# Patient Record
Sex: FEMALE | Race: WHITE
[De-identification: names, ages, dates, MRNs, and addresses within clinical notes are randomized per-mention and may not be internally consistent; named-entity substitution may affect disease eponyms.]

---

## 2018-09-21 ENCOUNTER — HOSPITAL ENCOUNTER (INPATIENT)
Dept: HOSPITAL 63 - ER | Age: 73
LOS: 13 days | Discharge: NURSING, REHAB OR LONG-TERM CARE | DRG: 885 | End: 2018-10-04
Attending: PSYCHIATRY & NEUROLOGY | Admitting: PSYCHIATRY & NEUROLOGY
Payer: MEDICARE

## 2018-09-21 VITALS — BODY MASS INDEX: 35.15 KG/M2 | WEIGHT: 191 LBS | HEIGHT: 62 IN

## 2018-09-21 DIAGNOSIS — N18.9: ICD-10-CM

## 2018-09-21 DIAGNOSIS — E78.5: ICD-10-CM

## 2018-09-21 DIAGNOSIS — I12.9: ICD-10-CM

## 2018-09-21 DIAGNOSIS — N39.0: ICD-10-CM

## 2018-09-21 DIAGNOSIS — F63.9: ICD-10-CM

## 2018-09-21 DIAGNOSIS — Z86.711: ICD-10-CM

## 2018-09-21 DIAGNOSIS — G20: ICD-10-CM

## 2018-09-21 DIAGNOSIS — F09: ICD-10-CM

## 2018-09-21 DIAGNOSIS — F33.41: ICD-10-CM

## 2018-09-21 DIAGNOSIS — R45.850: ICD-10-CM

## 2018-09-21 DIAGNOSIS — G25.9: ICD-10-CM

## 2018-09-21 DIAGNOSIS — J44.9: ICD-10-CM

## 2018-09-21 DIAGNOSIS — Z87.440: ICD-10-CM

## 2018-09-21 DIAGNOSIS — K59.09: ICD-10-CM

## 2018-09-21 DIAGNOSIS — Z86.718: ICD-10-CM

## 2018-09-21 DIAGNOSIS — Z98.42: ICD-10-CM

## 2018-09-21 DIAGNOSIS — F02.80: ICD-10-CM

## 2018-09-21 DIAGNOSIS — Z88.8: ICD-10-CM

## 2018-09-21 DIAGNOSIS — F33.3: Primary | ICD-10-CM

## 2018-09-21 DIAGNOSIS — F41.9: ICD-10-CM

## 2018-09-21 DIAGNOSIS — Z79.899: ICD-10-CM

## 2018-09-21 DIAGNOSIS — Z99.3: ICD-10-CM

## 2018-09-21 DIAGNOSIS — E11.22: ICD-10-CM

## 2018-09-21 DIAGNOSIS — K21.9: ICD-10-CM

## 2018-09-21 DIAGNOSIS — Z98.41: ICD-10-CM

## 2018-09-21 DIAGNOSIS — M10.9: ICD-10-CM

## 2018-09-21 DIAGNOSIS — Z86.73: ICD-10-CM

## 2018-09-21 DIAGNOSIS — Z79.01: ICD-10-CM

## 2018-09-21 LAB
ACETAMIN: < 2 MCG/ML (ref 10–30)
ALBUMIN SERPL-MCNC: 3.4 G/DL (ref 3.4–5)
ALBUMIN/GLOB SERPL: 0.9 {RATIO} (ref 1–1.7)
ALP SERPL-CCNC: 77 U/L (ref 46–116)
ALT SERPL-CCNC: 17 U/L (ref 14–59)
AMPHETAMINE/METHAMPHETAMINE: (no result)
ANION GAP SERPL CALC-SCNC: 8 MMOL/L (ref 6–14)
APTT PPP: YELLOW S
AST SERPL-CCNC: 6 U/L (ref 15–37)
BACTERIA #/AREA URNS HPF: (no result) /HPF
BARBITURATES UR-MCNC: (no result) UG/ML
BASOPHILS # BLD AUTO: 0.1 X10^3/UL (ref 0–0.2)
BASOPHILS NFR BLD: 1 % (ref 0–3)
BENZODIAZ UR-MCNC: (no result) UG/L
BILIRUB SERPL-MCNC: 0.5 MG/DL (ref 0.2–1)
BILIRUB UR QL STRIP: (no result)
BUN/CREAT SERPL: 26 (ref 6–20)
CA-I SERPL ISE-MCNC: 26 MG/DL (ref 7–20)
CALCIUM SERPL-MCNC: 9.2 MG/DL (ref 8.5–10.1)
CANNABINOIDS UR-MCNC: (no result) UG/L
CHLORIDE SERPL-SCNC: 104 MMOL/L (ref 98–107)
CO2 SERPL-SCNC: 29 MMOL/L (ref 21–32)
COCAINE UR-MCNC: (no result) NG/ML
CREAT SERPL-MCNC: 1 MG/DL (ref 0.6–1)
EOSINOPHIL NFR BLD: 0.5 X10^3/UL (ref 0–0.7)
EOSINOPHIL NFR BLD: 5 % (ref 0–3)
ERYTHROCYTE [DISTWIDTH] IN BLOOD BY AUTOMATED COUNT: 15.2 % (ref 11.5–14.5)
FIBRINOGEN PPP-MCNC: (no result) MG/DL
GFR SERPLBLD BASED ON 1.73 SQ M-ARVRAT: 54.5 ML/MIN
GLOBULIN SER-MCNC: 3.9 G/DL (ref 2.2–3.8)
GLUCOSE SERPL-MCNC: 134 MG/DL (ref 70–99)
GLUCOSE UR STRIP-MCNC: (no result) MG/DL
HCT VFR BLD CALC: 43.7 % (ref 36–47)
HGB BLD-MCNC: 14.7 G/DL (ref 12–15.5)
LYMPHOCYTES # BLD: 3.3 X10^3/UL (ref 1–4.8)
LYMPHOCYTES NFR BLD AUTO: 34 % (ref 24–48)
MAGNESIUM SERPL-MCNC: 1.6 MG/DL (ref 1.8–2.4)
MCH RBC QN AUTO: 32 PG (ref 25–35)
MCHC RBC AUTO-ENTMCNC: 34 G/DL (ref 31–37)
MCV RBC AUTO: 94 FL (ref 79–100)
METHADONE SERPL-MCNC: (no result) NG/ML
MONO #: 0.9 X10^3/UL (ref 0–1.1)
MONOCYTES NFR BLD: 10 % (ref 0–9)
NEUT #: 4.7 X10^3UL (ref 1.8–7.7)
NEUTROPHILS NFR BLD AUTO: 50 % (ref 31–73)
NITRITE UR QL STRIP: (no result)
OPIATES UR-MCNC: (no result) NG/ML
PCP SERPL-MCNC: (no result) MG/DL
PLATELET # BLD AUTO: 261 X10^3/UL (ref 140–400)
POTASSIUM SERPL-SCNC: 4.2 MMOL/L (ref 3.5–5.1)
PROT SERPL-MCNC: 7.3 G/DL (ref 6.4–8.2)
RBC # BLD AUTO: 4.63 X10^6/UL (ref 3.5–5.4)
RBC #/AREA URNS HPF: 0 /HPF (ref 0–2)
SALIC: 0.9 MG/DL (ref 2.8–20)
SODIUM SERPL-SCNC: 141 MMOL/L (ref 136–145)
SP GR UR STRIP: >=1.03
SQUAMOUS #/AREA URNS LPF: (no result) /LPF
UROBILINOGEN UR-MCNC: 0.2 MG/DL
WBC # BLD AUTO: 9.5 X10^3/UL (ref 4–11)

## 2018-09-21 PROCEDURE — 84484 ASSAY OF TROPONIN QUANT: CPT

## 2018-09-21 PROCEDURE — 83036 HEMOGLOBIN GLYCOSYLATED A1C: CPT

## 2018-09-21 PROCEDURE — 82947 ASSAY GLUCOSE BLOOD QUANT: CPT

## 2018-09-21 PROCEDURE — 85025 COMPLETE CBC W/AUTO DIFF WBC: CPT

## 2018-09-21 PROCEDURE — 86592 SYPHILIS TEST NON-TREP QUAL: CPT

## 2018-09-21 PROCEDURE — 85610 PROTHROMBIN TIME: CPT

## 2018-09-21 PROCEDURE — 82607 VITAMIN B-12: CPT

## 2018-09-21 PROCEDURE — 80061 LIPID PANEL: CPT

## 2018-09-21 PROCEDURE — 84436 ASSAY OF TOTAL THYROXINE: CPT

## 2018-09-21 PROCEDURE — 83735 ASSAY OF MAGNESIUM: CPT

## 2018-09-21 PROCEDURE — 80053 COMPREHEN METABOLIC PANEL: CPT

## 2018-09-21 PROCEDURE — P9612 CATHETERIZE FOR URINE SPEC: HCPCS

## 2018-09-21 PROCEDURE — 84480 ASSAY TRIIODOTHYRONINE (T3): CPT

## 2018-09-21 PROCEDURE — G0480 DRUG TEST DEF 1-7 CLASSES: HCPCS

## 2018-09-21 PROCEDURE — 84443 ASSAY THYROID STIM HORMONE: CPT

## 2018-09-21 PROCEDURE — 81001 URINALYSIS AUTO W/SCOPE: CPT

## 2018-09-21 PROCEDURE — 83550 IRON BINDING TEST: CPT

## 2018-09-21 PROCEDURE — 93005 ELECTROCARDIOGRAM TRACING: CPT

## 2018-09-21 PROCEDURE — 82553 CREATINE MB FRACTION: CPT

## 2018-09-21 PROCEDURE — 83540 ASSAY OF IRON: CPT

## 2018-09-21 PROCEDURE — 80307 DRUG TEST PRSMV CHEM ANLYZR: CPT

## 2018-09-21 PROCEDURE — 87086 URINE CULTURE/COLONY COUNT: CPT

## 2018-09-21 PROCEDURE — 94640 AIRWAY INHALATION TREATMENT: CPT

## 2018-09-21 PROCEDURE — 82306 VITAMIN D 25 HYDROXY: CPT

## 2018-09-21 PROCEDURE — G0479 DRUG TEST PRESUMP NOT OPT: HCPCS

## 2018-09-21 PROCEDURE — 36415 COLL VENOUS BLD VENIPUNCTURE: CPT

## 2018-09-21 NOTE — PHYS DOC
Past History


Past Medical History:  COPD, Dementia, Depression, Diabetes, GERD, Hypertension

, Other


Past Medical History


Limited by patient's history of dementia.


Past Surgical History:  Other


Past Surgical History


Limited by patient's history of dementia.


Smoking:  Non-smoker


Alcohol Use:  None


Drug Use:  None


Social History


Limited by patient's history of dementia.





Adult General


Chief Complaint


Chief Complaint:  PSYCH EVALUATION





HPI


HPI





Patient is a 73-year-old  female who presents to the ED from [facility] in [place], [place] for psychiatric evaluation for 

placement in the senior behavioral health unit. She states she was brought in 

by her brothers. She notes that she does not feel safe at the nursing facility 

as a result of her paranoid thoughts. She states that sometimes she thinks 

nursing staff at the home is "missing with my meds and not giving me the right 

things at the right time." She admits that it is possible that her suspicions 

are not real. She also endorses visual hallucinations and nightmares. She 

states that she had a fall a while back and since then she occasionally sees 

tissue paper around the room and sees cats coming in and out of her room. She 

notes that her nightmares "sometimes seem like reality". She currently denies 

suicidal ideation or homicidal ideation. She is unable to note when these 

symptoms began. Additionally, she notes tremors in all 4 extremities due to her 

Parkinson's and cramping pain in her toes, which she rates as a 3/10. She also 

notes mild epigastric burning as a result of her GERD. She denies any headache, 

dizziness, nausea, vomiting, diarrhea, constipation, chest pain, or heart 

palpitations.





HPI limited by patient's history of dementia.





Review of Systems


Review of Systems





Constitutional: Denies fever or chills


Eyes: Denies change in visual acuity, redness, or eye pain


HENT: Denies nasal congestion or sore throat


Respiratory: Denies cough or shortness of breath


Cardiovascular: Denies chest pain or heart palpitations


GI: Denies abdominal pain, nausea, vomiting, diarrhea, or constipation


Musculoskeletal: Notes slight joint pain; Denies back pain


Integument: Denies rash or skin lesions


Neurologic: Notes tremors in all 4 extremities; Denies headache, focal weakness 

or sensory changes


Psychiatric: Notes paranoia and visual hallucinations; Denies suicidal ideation 

or homicidal ideation








ROS limited by patient's history of dementia.





Family History


Family History


Noncontributory





Allergies


Allergies





Allergies








Coded Allergies Type Severity Reaction Last Updated Verified


 


  Sulfa (Sulfonamide Antibiotics) Allergy Unknown  9/21/18 Yes


 


  ketamine Allergy Unknown  9/21/18 Yes











Physical Exam


Physical Exam





Constitutional: Slightly disheveled, no acute distress, non-toxic appearance


HENT: Normocephalic, atraumatic, oropharynx dry


Eyes: Conjunctiva normal, no discharge


Neck: No tenderness, supple


Cardiovascular: Heart rate regular rhythm, no murmur


Lungs & Thorax:  Bilateral breath sounds clear to auscultation


Abdomen: Soft, no tenderness


Skin: Warm, dry, no erythema, no rash


Back: No tenderness, no CVA tenderness


Extremities: No tenderness, ROM intact, no edema


Neurologic: Alert and oriented X 3, generalized weakness, moderate tremors in 

all 4 extremities that worsen with movement, normal sensory function


Psychologic: Affect normal, judgement abnormal, patient with some paranoid 

delusions





Current Patient Data


Vital Signs





 Vital Signs








  Date Time  Temp Pulse Resp B/P (MAP) Pulse Ox O2 Delivery O2 Flow Rate FiO2


 


9/21/18 19:54 98.2 80 22  95 Room Air  








Lab Results





 Laboratory Tests








Test


 9/21/18


20:13 9/21/18


20:20


 


White Blood Count


 9.5 x10^3/uL


(4.0-11.0) 





 


Red Blood Count


 4.63 x10^6/uL


(3.50-5.40) 





 


Hemoglobin


 14.7 g/dL


(12.0-15.5) 





 


Hematocrit


 43.7 %


(36.0-47.0) 





 


Mean Corpuscular Volume


 94 fL ()


 





 


Mean Corpuscular Hemoglobin 32 pg (25-35)   


 


Mean Corpuscular Hemoglobin


Concent 34 g/dL


(31-37) 





 


Red Cell Distribution Width


 15.2 %


(11.5-14.5)  H 





 


Platelet Count


 261 x10^3/uL


(140-400) 





 


Neutrophils (%) (Auto) 50 % (31-73)   


 


Lymphocytes (%) (Auto) 34 % (24-48)   


 


Monocytes (%) (Auto) 10 % (0-9)  H 


 


Eosinophils (%) (Auto) 5 % (0-3)  H 


 


Basophils (%) (Auto) 1 % (0-3)   


 


Neutrophils # (Auto)


 4.7 x10^3uL


(1.8-7.7) 





 


Lymphocytes # (Auto)


 3.3 x10^3/uL


(1.0-4.8) 





 


Monocytes # (Auto)


 0.9 x10^3/uL


(0.0-1.1) 





 


Eosinophils # (Auto)


 0.5 x10^3/uL


(0.0-0.7) 





 


Basophils # (Auto)


 0.1 x10^3/uL


(0.0-0.2) 





 


Urine Collection Type U cath   


 


Urine Color Yellow   


 


Urine Clarity Hazy   


 


Urine pH 5.5   


 


Urine Specific Gravity >=1.030   


 


Urine Protein


 30 mg/dl


(NEG-TRACE) 





 


Urine Glucose (UA)


 Neg mg/dL


(NEG) 





 


Urine Ketones (Stick)


 Trace mg/dL


(NEG) 





 


Urine Blood Neg (NEG)   


 


Urine Nitrite Neg (NEG)   


 


Urine Bilirubin Neg (NEG)   


 


Urine Urobilinogen Dipstick


 0.2 mg/dL (0.2


mg/dL) 





 


Urine Leukocyte Esterase Small (NEG)   


 


Urine RBC 0 /HPF (0-2)   


 


Urine WBC


 11-20 /HPF


(0-4) 





 


Urine Squamous Epithelial


Cells Mod /LPF  


 





 


Urine Transitional Epithelial


Cells Occ /LPF  


 





 


Urine Bacteria


 Few /HPF


(0-FEW) 





 


Urine Mucus Slight /LPF   


 


Sodium Level


 141 mmol/L


(136-145) 





 


Potassium Level


 4.2 mmol/L


(3.5-5.1) 





 


Chloride Level


 104 mmol/L


() 





 


Carbon Dioxide Level


 29 mmol/L


(21-32) 





 


Anion Gap 8 (6-14)   


 


Blood Urea Nitrogen


 26 mg/dL


(7-20)  H 





 


Creatinine


 1.0 mg/dL


(0.6-1.0) 





 


Estimated GFR


(Cockcroft-Gault) 54.5  


 





 


BUN/Creatinine Ratio 26 (6-20)  H 


 


Glucose Level


 134 mg/dL


(70-99)  H 





 


Calcium Level


 9.2 mg/dL


(8.5-10.1) 





 


Magnesium Level


 1.6 mg/dL


(1.8-2.4)  L 





 


Total Bilirubin


 0.5 mg/dL


(0.2-1.0) 





 


Aspartate Amino Transferase


(AST) 6 U/L (15-37)


L 





 


Alanine Aminotransferase (ALT)


 17 U/L (14-59)


 





 


Alkaline Phosphatase


 77 U/L


() 





 


Creatine Kinase


 97 U/L


() 





 


Creatine Kinase MB (Mass)


 2.7 ng/mL


(0.0-3.6) 





 


Creatine Kinase MB Relative


Index 2.8 % (0-4)  


 





 


Troponin I Quantitative


 < 0.017 ng/mL


(0-0.055) 





 


Total Protein


 7.3 g/dL


(6.4-8.2) 





 


Albumin


 3.4 g/dL


(3.4-5.0) 





 


Albumin/Globulin Ratio


 0.9 (1.0-1.7)


L 





 


Salicylates Level


 0.9 mg/dL


(2.8-20.0)  L 





 


Salicylate Last Dose Date Unk   


 


Salicylate Last Dose Time Unk   


 


Urine Opiates Screen Pos (NEG)   


 


Urine Methadone Screen Neg (NEG)   


 


Acetaminophen Level


 < 2.0 mcg/mL


(10-30)  L 





 


Acetaminophen Last Dose Date Unk   


 


Acetaminophen Last Dose Time Unk   


 


Urine Barbiturates Neg (NEG)   


 


Urine Phencyclidine Screen Neg (NEG)   


 


Urine


Amphetamine/Methamphetamine Neg (NEG)  


 





 


Urine Benzodiazepines Screen Neg (NEG)   


 


Urine Cocaine Screen Neg (NEG)   


 


Urine Cannabinoids Screen Neg (NEG)   


 


Ethyl Alcohol Level


 < 10 mg/dL


(0-10) 





 


Urine Ethyl Alcohol Neg (NEG)   


 


Glucose (Fingerstick)


 


 159 mg/dL


(70-99)  H











EKG


EKG


@ 22:02, NSR with baseline artifact, NO ST elevation





Radiology/Procedures


Radiology/Procedures


[]





Course & Med Decision Making


Course & Med Decision Making





Patient is a 73-year-old  female who presents to the ED from [facility] in [place], [place] for psychiatric evaluation for 

placement in the senior behavioral health unit. Pertinent Labs and Imaging 

studies reviewed (see chart for details). A 12-lead EKG at 22:02 was obtained 

and showed NSR with significant artifact due to patient's tremors. Her CBC and 

CMP results were consistent with her baseline. Urinalysis showed hazy urine 

with small leukocyte esterase, 11-20 WBC, and some protein. Suspect possible 

UTI. Patient will be started on Keflex. Patient also received a one-time dose 

of Ativan for her tremors and anxiety and a one-time dose of famotidine for her 

epigastric burning. Patient will be admitted to the Senior Behavioral Unit here 

at [facility] for further evaluation and treatment under Dr. Schafer. 

Patient medically cleared for admission to [facility]. Discussed 

findings and plan with patient who acknowledges understanding and agreement.





Dragon Disclaimer


Dragon Disclaimer


This electronic medical record was generated, in whole or in part, using a 

voice recognition dictation system.





Departure


Departure:


Impression:  


 Primary Impression:  


 MDD (major depressive disorder)


 Additional Impressions:  


 Dementia


 UTI (urinary tract infection)


Disposition:  66 XFER TO PSYCH HOSP/UNIT (Senior Behavioral Unit at [facility])


Admitting Physician:  Other (Dr. ALEXANDR Moseley)


Condition:  GUARDED


Referrals:  


KRISTEN RENTERIA DO (PCP)





Problem Qualifiers








 Primary Impression:  


 MDD (major depressive disorder)


 Major depression recurrence:  recurrent  Active/Remission status:  currently 

active  Major depression episode severity:  severe  Psychotic features:  with 

psychotic features  Qualified Codes:  F33.3 - Major depressive disorder, 

recurrent, severe with psychotic symptoms


 Additional Impressions:  


 Dementia


 Dementia type:  unspecified type  Dementia behavioral disturbance:  with 

behavioral disturbance  Qualified Codes:  F03.91 - Unspecified dementia with 

behavioral disturbance


 UTI (urinary tract infection)


 Urinary tract infection type:  acute cystitis  Hematuria presence:  without 

hematuria  Qualified Codes:  N30.00 - Acute cystitis without hematuria








PAT YUAN DO Sep 21, 2018 21:55

## 2018-09-21 NOTE — EKG
[facility] [address], [place] [address]

Test Date:    2018               Test Time:    22:02:18

Pat Name:     JULES DEE             Department:   

Patient ID:   [id number]           Room:          

Gender:       F                        Technician:   

:          1945               Requested By: PAT YUAN

Order Number: [id number]            Reading MD:   Franklin Park

                                 Measurements

Intervals                              Axis          

Rate:         69                       P:            

CO:                                    QRS:          121

QRSD:         92                       T:            19

QT:           368                                    

QTc:          396                                    

                           Interpretive Statements

SINUS RHYTHM

ABNORMAL RIGHT AXIS DEVIATION



Electronically Signed On 2018 10:52:56 CDT by Franklin Park

## 2018-09-22 VITALS — DIASTOLIC BLOOD PRESSURE: 69 MMHG | SYSTOLIC BLOOD PRESSURE: 145 MMHG

## 2018-09-22 VITALS — SYSTOLIC BLOOD PRESSURE: 148 MMHG | DIASTOLIC BLOOD PRESSURE: 75 MMHG

## 2018-09-22 VITALS — SYSTOLIC BLOOD PRESSURE: 163 MMHG | DIASTOLIC BLOOD PRESSURE: 100 MMHG

## 2018-09-22 LAB
CHOLEST/HDLC SERPL: 3 {RATIO}
HBA1C MFR BLD: 7.1 % (ref 4.8–5.6)
HDLC SERPL-MCNC: 61 MG/DL (ref 40–60)
LDLC: 124 MG/DL (ref 0–100)
THYROID STIM HORMONE (TSH): 0.63 UIU/ML (ref 0.36–3.74)
TRIGL SERPL-MCNC: 195 MG/DL (ref 0–150)
VLDLC: 39 MG/DL (ref 0–40)

## 2018-09-22 RX ADMIN — SENNOSIDES A AND B SCH MG: 8.6 TABLET, FILM COATED ORAL at 09:00

## 2018-09-22 RX ADMIN — BUDESONIDE SCH MG: 0.5 SUSPENSION RESPIRATORY (INHALATION) at 20:48

## 2018-09-22 RX ADMIN — HYDROCODONE BITARTRATE AND ACETAMINOPHEN SCH TAB: 5; 325 TABLET ORAL at 19:59

## 2018-09-22 RX ADMIN — HYDROCODONE BITARTRATE AND ACETAMINOPHEN SCH TAB: 5; 325 TABLET ORAL at 17:07

## 2018-09-22 RX ADMIN — POTASSIUM CHLORIDE SCH MEQ: 1500 TABLET, EXTENDED RELEASE ORAL at 08:16

## 2018-09-22 RX ADMIN — CEPHALEXIN SCH MG: 250 CAPSULE ORAL at 19:53

## 2018-09-22 RX ADMIN — IPRATROPIUM BROMIDE AND ALBUTEROL SULFATE SCH ML: .5; 3 SOLUTION RESPIRATORY (INHALATION) at 20:48

## 2018-09-22 RX ADMIN — LOSARTAN POTASSIUM SCH MG: 25 TABLET, FILM COATED ORAL at 08:16

## 2018-09-22 RX ADMIN — Medication PRN EACH: at 11:16

## 2018-09-22 RX ADMIN — CARBIDOPA AND LEVODOPA SCH TAB: 25; 250 TABLET ORAL at 17:06

## 2018-09-22 RX ADMIN — HYDROCODONE BITARTRATE AND ACETAMINOPHEN SCH TAB: 5; 325 TABLET ORAL at 08:18

## 2018-09-22 RX ADMIN — HYDROCODONE BITARTRATE AND ACETAMINOPHEN SCH TAB: 5; 325 TABLET ORAL at 12:42

## 2018-09-22 RX ADMIN — OXYCODONE HYDROCHLORIDE AND ACETAMINOPHEN SCH MG: 500 TABLET ORAL at 08:18

## 2018-09-22 RX ADMIN — ALLOPURINOL SCH MG: 300 TABLET ORAL at 08:17

## 2018-09-22 RX ADMIN — METHENAMINE HIPPURATE SCH GM: 1 TABLET ORAL at 08:19

## 2018-09-22 RX ADMIN — IPRATROPIUM BROMIDE AND ALBUTEROL SULFATE SCH ML: .5; 3 SOLUTION RESPIRATORY (INHALATION) at 16:00

## 2018-09-22 RX ADMIN — METOPROLOL SUCCINATE SCH MG: 50 TABLET, EXTENDED RELEASE ORAL at 08:17

## 2018-09-22 RX ADMIN — OXYCODONE HYDROCHLORIDE AND ACETAMINOPHEN SCH MG: 500 TABLET ORAL at 19:53

## 2018-09-22 RX ADMIN — INSULIN LISPRO SCH UNITS: 100 INJECTION, SOLUTION INTRAVENOUS; SUBCUTANEOUS at 12:41

## 2018-09-22 RX ADMIN — METHENAMINE HIPPURATE SCH GM: 1 TABLET ORAL at 19:59

## 2018-09-22 RX ADMIN — INSULIN LISPRO SCH UNITS: 100 INJECTION, SOLUTION INTRAVENOUS; SUBCUTANEOUS at 17:10

## 2018-09-22 RX ADMIN — INSULIN LISPRO SCH UNITS: 100 INJECTION, SOLUTION INTRAVENOUS; SUBCUTANEOUS at 08:20

## 2018-09-22 RX ADMIN — MELATONIN TAB 3 MG SCH MG: 3 TAB at 19:58

## 2018-09-22 RX ADMIN — POLYETHYLENE GLYCOL 3350 SCH GM: 17 POWDER, FOR SOLUTION ORAL at 09:00

## 2018-09-22 RX ADMIN — IPRATROPIUM BROMIDE AND ALBUTEROL SULFATE SCH ML: .5; 3 SOLUTION RESPIRATORY (INHALATION) at 16:47

## 2018-09-22 RX ADMIN — PANTOPRAZOLE SODIUM SCH MG: 40 TABLET, DELAYED RELEASE ORAL at 08:16

## 2018-09-22 RX ADMIN — POLYETHYLENE GLYCOL 3350 SCH GM: 17 POWDER, FOR SOLUTION ORAL at 08:18

## 2018-09-22 RX ADMIN — CEPHALEXIN SCH MG: 250 CAPSULE ORAL at 12:42

## 2018-09-22 RX ADMIN — AMANTADINE HYDROCHLORIDE SCH MG: 100 CAPSULE ORAL at 08:15

## 2018-09-22 RX ADMIN — SENNOSIDES A AND B SCH MG: 8.6 TABLET, FILM COATED ORAL at 08:16

## 2018-09-22 RX ADMIN — IPRATROPIUM BROMIDE AND ALBUTEROL SULFATE SCH ML: .5; 3 SOLUTION RESPIRATORY (INHALATION) at 12:08

## 2018-09-22 RX ADMIN — SERTRALINE HYDROCHLORIDE SCH MG: 50 TABLET ORAL at 08:16

## 2018-09-22 RX ADMIN — LUBIPROSTONE SCH MCG: 24 CAPSULE, GELATIN COATED ORAL at 08:19

## 2018-09-22 RX ADMIN — CARBIDOPA AND LEVODOPA SCH TAB: 25; 250 TABLET ORAL at 05:31

## 2018-09-22 RX ADMIN — Medication SCH CAP: at 19:58

## 2018-09-22 RX ADMIN — INSULIN GLARGINE SCH UNITS: 100 INJECTION, SOLUTION SUBCUTANEOUS at 20:00

## 2018-09-22 RX ADMIN — CETIRIZINE HYDROCHLORIDE SCH MG: 10 TABLET, FILM COATED ORAL at 08:17

## 2018-09-22 RX ADMIN — BUDESONIDE SCH MG: 0.5 SUSPENSION RESPIRATORY (INHALATION) at 12:08

## 2018-09-22 RX ADMIN — CARBIDOPA AND LEVODOPA SCH TAB: 25; 250 TABLET ORAL at 12:41

## 2018-09-22 RX ADMIN — CEPHALEXIN SCH MG: 250 CAPSULE ORAL at 08:17

## 2018-09-22 RX ADMIN — AMANTADINE HYDROCHLORIDE SCH MG: 100 CAPSULE ORAL at 19:59

## 2018-09-22 NOTE — HP
ADMIT DATE:  09/22/2018



PSYCHIATRIC ADMISSION HISTORY/EVALUATION



This note covers elements not covered in my initial note 09/22/2018.



IDENTIFYING DATA:  The patient is a 73-year-old  female referred to us

from [facility] by Dr. Pawan Marquez, her primary care physician

on account of worsening paranoia.  The patient was getting extremely paranoid,

suspicious, and aggressive.  She pulled the hair of nursing staff, had voiced

suicidal ideation, talking about Gus coming to get everyone.  She believes she

was being poisoned and killed by staff.  She had expressed a desire to die and

later also said Gus will come to help her walk and to get her away from the

devil.  She had failed outpatient psychiatric interventions including visits

with the psychologist.



CHIEF COMPLAINT:  "My troubles get worse at times.  I have come here to get

that corrected."



HISTORY OF PRESENT ILLNESS:  The patient has a history of mild cognitive

impairment, short term memory deficits.  She has appeared increasingly

depressed, paranoid, suspicious, anxious, impulsive with suicidal and homicidal

statements as noted above.  No clear history of bipolar disorder.  She has also

had sleep and appetite changes.  The parkinsonian tremors have been worsening.



PAST PSYCHIATRIC HISTORY:  As above.



PAST MEDICAL HISTORY:  Positive for Parkinson's disease, COPD, type 2 diabetes

mellitus, history of PE, hyperlipidemia, GERD, hypokalemia, status post

cerebrovascular accident.  Accu-Cheks a.c. and at bedtime.  She does have a UTI

and started on Keflex in the ER.  She ambulates in a wheelchair.



ALLERGIES:  KETAMINE, SULFA.



FAMILY HISTORY:  Noncontributory.



SOCIAL HISTORY:  No physical, sexual or elder abuse history is noted.  She is

not known to be a perpetrator.  She denies any alcohol usage, but she states for

1 year following her divorce, she was using excessive amounts of alcohol that

worsened, not passing out and denies any blackouts, DUIs or DTs at that time.



REACTION TO HOSPITALIZATION:  The patient accepting of it.



ASSETS:  Supportive family, stable living at the nursing home.



MENTAL STATUS EXAMINATION:  The patient was seen individually on the evening of

09/22/2018.  She is oriented to herself and situation.  She initially thought

the year was 2019, corrected it later to 2018 with my assistance.  She knew the

President, who is President, Trump.  Speech is coherent, somewhat low in volume,

parkinsonian tremors were evident.  Abstraction fair.  Computation, able to do

one step in serial sevens, remember 2/3 objects at 3 minutes.  No active

suicidal or homicidal ideation.  Attention span short, language function intact.

 Mood is somewhat depressed, anxious.  Affect is mood congruent.



IMPRESSION:  Major depressive disorder, recurrent; anxiety disorder,

unspecified; mild cognitive impairment versus major neurocognitive disorder,

early secondary to Parkinson's with delusion, depression.



IMPRESSION:  Major depressive disorder, rule out psychotic features; anxiety

disorder, unspecified; impulse control disorder, unspecified; mild cognitive

impairment.  Rest as above including urinary tract infection.



PLAN:  Admit to Geropsychiatry Unit at [facility].  I will see the

patient daily individually from a psychiatric standpoint, medical followup with

Dr. Pritchard/Dr. Heath.  We will request Dr. Montoya for followup secondary to

Parkinson's and at the time of this dictation, she has been started on Sinemet

p.r.n. 25/100 b.i.d. per Dr. Montoya.  We will start melatonin 3 mg at bedtime

since she slept just 2-1/2 hours previous night.  Further changes depending on

baseline assessment.





______________________________

JULIANA CARDONA MD



DR:  RANCHO/angel  JOB#:  [id number] / [id number]

DD:  09/22/2018 18:32  DT:  09/22/2018 19:12

## 2018-09-22 NOTE — CONS
DATE OF CONSULTATION:  2018



HISTORY OF PRESENT ILLNESS:  The patient is a 73-year-old  female

patient, a resident at [facility], who was admitted on account of being

paranoid, believes that the staff is trying to kill her, expresses that she

wants to die because Gus will come to her and help her walk to get away from

the devil, all this in a background of dementia with behavioral disturbances. 

Medically, she has multiple medical problems including Parkinson's disease,

chronic kidney disease, type 2 diabetes, history of pulmonary embolism,

hyperlipidemia, gastroesophageal reflux disease, hyperkalemia, COPD and history

of cerebrovascular accident.



PAST SURGICAL HISTORY:  Significant for two  sections and bilateral

cataract extraction.



ALLERGIES:  SHE IS ALLERGIC TO SULFA DRUGS AND KETAMINE.



MEDICATIONS:  She is currently on following medications:  She is on loratadine

for Claritin 10 mg once a day, methenamine hippurate 1 gram twice a day,

ipratropium bromide -- albuterol sulfate 0.5 -- 2.5 mg in 3 mL by nebulizer 4

times a day, Spiriva HandiHaler 1 inhalation once a day, warfarin sodium 1.5 mg

daily, metoprolol succinate 100 mg once a day, Benicar 5 mg once a day,

hydrocodone/APAP 5/325 one tablet twice a day as needed and one tablet 4 times a

day scheduled.  She is on sertraline for Zoloft 50 mg once a day, Ativan 1 mg 3

times a day, amantadine 200 mg twice a day.  She is on carbidopa/levodopa 25/250

four times a day, potassium chloride 20 mEq once a day, Robitussin-DM 5 mL every

4 hours as needed.  She is on Symbicort 160/4.5 two puffs twice a day,

artificial tears 1 drop to both eyes 4 times a day, calcium carbonate 400 mg 3

times a day and Dayana-Lanta 30 mL every 6 hours, simethicone 80 mg 4 times a day

as needed, Amitiza 24 mcg p.o. daily and polyethylene glycol 17 grams daily and

Senna 1 tablet once a day, meclizine 25 mg 3 times a day, ondansetron 8 mg 3

times a day as needed for nausea and vomiting, omeprazole 20 mg once a day.  She

is on NovoLog insulin 5 units before meals and Lantus insulin 26 units at

bedtime, ascorbate calcium 500 mg twice a day, and allopurinol 300 mg daily.



REVIEW OF SYSTEMS:  The patient denied any blurring of vision, cataract,

glaucoma, or macular degeneration.  Denied any earache, tinnitus, or

sensorineural deafness.  Denied any nosebleeds, stuffy nose, or postnasal drip. 

Denied any sore throat, sore tongue, toothache, hoarseness of voice, or

difficulty swallowing.  Denied any nausea, vomiting, diarrhea, or constipation. 

Denied any chest pain, shortness of breath, orthopnea, paroxysmal nocturnal

dyspnea.



PHYSICAL EXAMINATION:

GENERAL:  When I examined her, she was sitting comfortably in her wheelchair, in

no apparent respiratory distress.  There is no pallor, jaundice or cyanosis.  No

lymphadenopathy, no thyromegaly.  No jugular venous distention.  No limb edema.

VITAL SIGNS:  Her heart rate was 88, blood pressure was 145/69, temperature was

98, respiratory rate 20, and oxygen saturation was 95% on room air.

HEAD, EYES, EARS, NOSE, AND THROAT:  Showed normocephalic, atraumatic.

NECK:  Supple.

HEART:  Showed normal first and second sounds.  No gallop, rub or murmur.

CHEST:  Clear to auscultation.  No crepitation or rhonchi.

ABDOMEN:  Distended, soft, and nontender.  No guarding or rigidity.  No

organomegaly.  All hernial orifice intact.  Bowel sounds normal.

NEUROLOGIC:  She is awake, alert.  She has a slow monotonous face, voice

consistent with Parkinson's disease and she has also reduced kinking and a pill

rolling tremors of both upper extremities that seems to be more pronounced. 

According to nursing staff, early this morning, she is mostly wheelchair bound. 

She stated that she can walk with a walker, but she needs assistance.



LABORATORY DATA:  Showed a white cell count 9500, hemoglobin 14.7, hematocrit

44, MCV 94 and a platelet count of 161,000.  Her prothrombin time was 20.5 and

INR of 2.1.  Her serum sodium was 141, potassium 4.2, chloride 104, bicarbonate

29, anion gap of 8, BUN 26, creatinine 1, estimated GFR was 54 mL per minute. 

Her glucose 134, calcium was 9.2, and magnesium was 1.6.  Serum iron was 51,

TIBC was 311 and iron saturation was 16%.  Her total bilirubin, AST, ALT,

alkaline phosphatase were normal.  Total protein was 7.3 and albumin 3.4.  Her

serum triglycerides were 195, total cholesterol was 224, LDL cholesterol 124,

VLDL was 39, HDL was 61 and the ratio was 3.  Her TSH was 0.633.  Her urinalysis

showed the urine was yellow, hazy with a pH of 5.5, specific gravity of 1.030 to

a small amount of protein, negative for glucose.  There was trace of ketones,

negative for blood, nitrite and small amount of leukocyte esterase.  There are 0

rbc's, 11-20 wbc's, moderate amount of epithelial cells and very few bacteria. 

Her urine toxicology screen was positive for opiates, but negative for

barbiturates, phencyclidine, amphetamine, methamphetamine, benzodiazepine,

cocaine, cannabinoids and alcohol.



IMPRESSION:  In summary, this is a 73-year-old  female patient, a

resident at [facility] in [place], [place], who was admitted

for being paranoid, stating that the nursing staff are planning to kill her. 

She also has visual hallucination and nightmares.  She stated that she sees a

lot of rabbits and she has unexplained fears of her closet.  She also see cats

coming in and out of her room; however, she denied any suicidal or homicidal

ideation.  She is on Sinemet that might be contributing to all these symptoms;

however, she has prominent parkinsonian symptoms and signs.  She has multiple

other medical problems including gout, gastroesophageal reflux disease, type 2

diabetes, chronic constipation, hypertension and history of DVT and PE for which

she is on Coumadin.  All in all, she seemed to be medically stable.  All her

vital signs are stable.  Her blood pressure seems to be within acceptable range.

 I will continue with all current medication.  I will obviously follow all the

lab work, still pending and make any necessary recommendation.



Thank you, Dr. Moseley for allowing me to participate in the care of this patient.





______________________________

EFRAÍN GREGORY MD



DR:  JAKOB/angel  JOB#:  [id number] / [id number]

DD:  2018 15:12  DT:  2018 22:39

## 2018-09-22 NOTE — PDOC
Exam


Note:


[facility] Note:


Please also refer to the separate dictated note~for this date of service 

dictated separately.~Patient seen individually. Discussed the patient with 

Nursing staff reviewed the chart.~Reviewed interim history and current 

functioning. Reviewed vital signs,~Labs/ Radiology~and current medications 

noted below. Continue current treatment with the changes noted in the dictated 

addendum note





Assessment:


Vital Signs:





 Vital Signs








  Date Time  Temp Pulse Resp B/P (MAP) Pulse Ox O2 Delivery O2 Flow Rate FiO2


 


9/22/18 20:59     95 Room Air  


 


9/22/18 17:07   20     


 


9/22/18 16:10 97.8 93  148/75 (99)    








Labs:





 Laboratory Tests








Test


 9/22/18


06:27 9/22/18


07:51 9/22/18


12:10 9/22/18


16:49


 


Prothrombin Time


 20.5 SEC


(9.4-11.4)  H 


 


 





 


Prothrombin Time INR


 2.1 (0.9-1.1)


H 


 


 





 


Hemoglobin A1c


 7.1 %


(4.8-5.6)  H 


 


 





 


Triglycerides Level


 195 mg/dL


(0-150)  H 


 


 





 


Cholesterol Level


 224 mg/dL


(0-200)  H 


 


 





 


LDL Cholesterol, Calculated


 124 mg/dL


(0-100)  H 


 


 





 


VLDL Cholesterol, Calculated


 39 mg/dL


(0-40) 


 


 





 


Non-HDL Cholesterol Calculated


 163 mg/dL


(0-129)  H 


 


 





 


HDL Cholesterol


 61 mg/dL


(40-60)  H 


 


 





 


Cholesterol/HDL Ratio 3.0     


 


Thyroid Stimulating Hormone


(TSH) 0.633 uIU/mL


(0.358-3.740) 


 


 





 


Glucose (Fingerstick)


 


 203 mg/dL


(70-99)  H 190 mg/dL


(70-99)  H 165 mg/dL


(70-99)  H


 


Test


 9/22/18


19:16 


 


 





 


Glucose (Fingerstick)


 184 mg/dL


(70-99)  H 


 


 














Current Medications:


Meds:





Current Medications


Cephalexin HCl (Keflex) 500 mg 1X  ONCE PO  Last administered on 9/21/18at 23:21

;  Start 9/21/18 at 23:15;  Stop 9/21/18 at 23:16;  Status DC


Insulin Human Lispro (HumaLOG) 0-5 UNITS TIDWMEALS SQ  Last administered on 9/22 /18at 17:10;  Start 9/22/18 at 08:00


Dextrose 12.5 gm PRN Q15MIN  PRN IV SEE COMMENTS;  Start 9/21/18 at 23:00


Cephalexin HCl (Keflex) 500 mg TID PO  Last administered on 9/22/18at 19:53;  

Start 9/22/18 at 09:00;  Stop 9/29/18 at 08:59


Lorazepam (Ativan) 0.5 mg 1X  ONCE PO  Last administered on 9/21/18at 23:21;  

Start 9/21/18 at 23:15;  Stop 9/21/18 at 23:16;  Status DC


Famotidine (Pepcid) 20 mg 1X  ONCE PO  Last administered on 9/21/18at 23:21;  

Start 9/21/18 at 23:15;  Stop 9/21/18 at 23:16;  Status DC


Acetaminophen (Tylenol) 650 mg PRN Q6HRS  PRN PO PAIN / TEMP;  Start 9/22/18 at 

00:15


Multi-Ingredient Ointment (Analgesic Balm) 1 mami PRN QID  PRN TP MUSCLE PAIN;  

Start 9/22/18 at 00:15


Magnesium Hydroxide (Milk Of Magnesia) 2,400 mg PRN QHS  PRN PO CONSTIPATION;  

Start 9/22/18 at 00:15


Calcium Carbonate/ Glycine (Tums) 400 mg PRN TID  PRN PO INDIGESTION;  Start 9/ 22/18 at 00:15


Insulin Glargine (Lantus) 26 units HS SQ  Last administered on 9/22/18at 20:00;

  Start 9/22/18 at 21:00


Albuterol Sulfate (Ventolin) 2.5 mg PRN QID  PRN NEB SHORTNESS OF BREATH;  

Start 9/22/18 at 00:15


Lubiprostone (Amitiza) 24 mcg DAILY PO  Last administered on 9/22/18at 08:19;  

Start 9/22/18 at 09:00


Methenamine Hippurate (Hiprex) 1 gm BID PO  Last administered on 9/22/18at 19:59

;  Start 9/22/18 at 09:00


Potassium Chloride (Klor-Con) 20 meq DAILY PO  Last administered on 9/22/18at 08

:16;  Start 9/22/18 at 09:00


Simethicone (Gas-X) 80 mg PRN QID  PRN PO GAS / BLOATING;  Start 9/22/18 at 00:

15


Allopurinol (Zyloprim) 300 mg DAILY PO  Last administered on 9/22/18at 08:17;  

Start 9/22/18 at 09:00


Amantadine HCl (Symmetrel) 200 mg BID PO  Last administered on 9/22/18at 19:59;

  Start 9/22/18 at 09:00


Ascorbic Acid (Vitamin C) 500 mg BID PO  Last administered on 9/22/18at 19:53;  

Start 9/22/18 at 09:00


Carbidopa/Levodopa (Sinemet 25/250) 1 tab [id number] PO  Last administered on 9/ 22/18at 17:06;  Start 9/22/18 at 06:00


Artificial Tears (Artificial Tears) 1 drop PRN Q6HRS  PRN OU DRY EYE;  Start 9/ 22/18 at 00:15


Guaifenesin (Robitussin Dm) 5 ml PRN Q4HRS  PRN PO COUGH;  Start 9/22/18 at 00:

15


Acetaminophen/ Hydrocodone Bitart (Lortab 5/325) 1 tab PRN BID  PRN PO PAIN;  

Start 9/22/18 at 00:15


Acetaminophen/ Hydrocodone Bitart (Lortab 5/325) 1 tab QID PO  Last 

administered on 9/22/18at 19:59;  Start 9/22/18 at 09:00


Insulin Aspart (Novolog Mix 70-30) 5 units TIDWMEALS SQ ;  Start 9/22/18 at 08:

00


Cetirizine HCl (ZyrTEC) 10 mg DAILY PO  Last administered on 9/22/18at 08:17;  

Start 9/22/18 at 09:00


Al Hydroxide/Mg Hydroxide (Mylanta Plus Xs) 30 ml PRN Q6HRS  PRN PO DYSPEPSIA;  

Start 9/22/18 at 00:15


Meclizine HCl (Antivert) 25 mg PRN TID  PRN PO DIZZINESS;  Start 9/22/18 at 00:

15


Metoprolol Succinate (Toprol Xl) 100 mg DAILY PO  Last administered on 9/22/ 18at 08:17;  Start 9/22/18 at 09:00


Ondansetron HCl (Zofran Odt) 8 mg PRN TID  PRN PO NAUSEA/VOMITING;  Start 9/22/ 18 at 00:15


Polyethylene Glycol (miraLAX) 17 gm DAILY PO ;  Start 9/22/18 at 09:00


Sennosides (Senna) 8.6 mg DAILY PO ;  Start 9/22/18 at 09:00


Warfarin Sodium (Coumadin Per Pharmacy) 1 each PRN DAILY  PRN MC SEE COMMENTS 

Last administered on 9/22/18at 11:16;  Start 9/22/18 at 00:15


Warfarin Sodium (Coumadin) 1.5 mg DAILY PO ;  Start 9/22/18 at 09:00;  Status 

UNV


Simethicone (Gas-X) 80 mg PRN QID  PRN PO GAS / BLOATING;  Start 9/22/18 at 00:

15;  Status UNV


Non-Formulary Medication (Budesonide/ Formoterol Fumarate (Symbicort 160-4.5 

Mcg Inhaler)) 2 puff BID IH ;  Start 9/22/18 at 09:00;  Stop 9/22/18 at 09:00;  

Status DC


Losartan Potassium (Cozaar) 25 mg DAILY PO  Last administered on 9/22/18at 08:16

;  Start 9/22/18 at 09:00


Pantoprazole Sodium (Protonix) 40 mg DAILYAC PO  Last administered on 9/22/18at 

08:16;  Start 9/22/18 at 07:30


Non-Formulary Medication (Tiotropium Bromide (Spiriva Respimat)) 2.5 gm DAILY 

IH ;  Start 9/22/18 at 09:00;  Stop 9/22/18 at 09:00;  Status DC


Lorazepam (Ativan) 1 mg TID PO  Last administered on 9/22/18at 19:52;  Start 9/ 22/18 at 09:00


Sertraline HCl (Zoloft) 50 mg DAILY PO  Last administered on 9/22/18at 08:16;  

Start 9/22/18 at 09:00


Info (Anti-Coagulation Monitoring By Pharmacy) 1 each PRN DAILY  PRN MC SEE 

COMMENTS;  Start 9/22/18 at 09:00


Albuterol/ Ipratropium (Duoneb) 3 ml RTQID NEB  Last administered on 9/22/18at 

20:48;  Start 9/22/18 at 08:00


Budesonide (Pulmicort) 0.5 mg RTBID NEB  Last administered on 9/22/18at 20:48;  

Start 9/22/18 at 08:00


Warfarin Sodium (Coumadin) 1.5 mg 1X WARF  ONCE PO  Last administered on 9/22/ 18at 17:08;  Start 9/22/18 at 16:00;  Stop 9/22/18 at 16:01;  Status DC


Warfarin Sodium (Coumadin) 0.5 mg 1X WARF  ONCE PO ;  Start 9/22/18 at 16:00;  

Stop 9/22/18 at 16:00;  Status DC


Lactobacillus Rhamnosus (Culturelle) 1 cap BID PO  Last administered on 9/22/ 18at 19:58;  Start 9/22/18 at 21:00


Carbidopa/ Levodopa/ Entacapone (Stalevo 100) 1 tab PRN BID  PRN PO TREMORS;  

Start 9/22/18 at 17:00;  Stop 9/22/18 at 17:03;  Status DC


Carbidopa/Levodopa (Sinemet 25/100) 1 tab PRN BID  PRN PO TREMORS;  Start 9/22/ 18 at 17:15


Melatonin 3 mg QHS PO  Last administered on 9/22/18at 19:58;  Start 9/22/18 at 

21:00





Active Scripts


Active


Reported


Omeprazole 20 Mg Capsule.dr 20 Mg PO DAILY


Claritin (Loratadine) 10 Mg Tablet 10 Mg PO DAILY


Ativan (Lorazepam) 1 Mg Tablet 1 Mg PO TID


Symbicort 160-4.5 Mcg Inhaler (Budesonide/Formoterol Fumarate) 10.2 Gm 

Hfa.aer.ad 2 Puff IH BID


Simethicone 80 Mg Tab.chew 80 Mg PO PRN QID PRN


Miralax (Polyethylene Glycol 3350) 17 Gm Powd.pack 17 Gm PO PRN DAILY PRN


Metoprolol Succinate ( Xl ) (Metoprolol Succinate) 100 Mg Tab.er.24h 100 Mg PO 

DAILY


Meclizine Hcl 25 Mg Tablet 25 Mg PO PRN TID PRN


Duoneb 0.5-3(2.5) Mg/3 Ml (Albuterol/Ipratropium) 3 Ml Ampul.neb 3 Ml NEB QID 

PRN


Artificial Tears Eye Drops (Dextran 70/Hypromellose) 15 Ml Drops 1 Drop EACHEYE 

QID PRN


Spiriva Respimat (Tiotropium Bromide) 4 Gm Mist.inhal 2.5 Gm IH DAILY


Vitamin C (Ascorbate Calcium) 500 Mg Tablet 500 Mg PO BID


Amantadine (Amantadine Hcl) 100 Mg Tablet 200 Mg PO BID


Tums (Calcium Carbonate) 200 Mg Tab.chew 400 Mg PO PRN TID PRN


Hiprex (Methenamine Hippurate) 1 Gm Tablet 1 Gm PO BID


Coumadin (Warfarin Sodium) 1 Mg Tablet 0.5 Mg PO DAILY


Coumadin (Warfarin Sodium) 1 Mg Tablet 1 Mg PO DAILY


Norco 5-325 Tablet (Hydrocodone Bit/Acetaminophen) 1 Each Tablet 1 Tab PO QID


Norco 5-325 Tablet (Hydrocodone Bit/Acetaminophen) 1 Each Tablet 1 Tab PO PRN 

BID PRN


Senna (Sennosides) 8.6 Mg Tablet 8.6 Mg PO DAILY


Dayana-Lanta Liquid (Mag Hydrox/Al Hydrox/Simeth) 355 Ml Oral.susp 30 Ml PO PRN 

Q6HRS PRN


Zofran (Ondansetron Hcl) 8 Mg Tablet 8 Mg PO PRN TID PRN


Zoloft (Sertraline Hcl) 50 Mg Tablet 50 Mg PO DAILY


Novolog (Insulin Aspart) 100 Unit/1 Ml Cartridge 5 Unit SQ TIDWMEALS


Robitussin Cough-Chest Dm Liq (Guaifenesin/Dextromethorphan) 237 Ml Liquid 5 Ml 

PO PRN Q4HRS PRN


Carbidopa-Levo  Mg Odt (Carbidopa/Levodopa) 1 Each Tab.rapdis 1 Tab PO QID


Simethicone 80 Mg Tab.chew 80 Mg PO PRN QID PRN


Lantus Solostar (Insulin Glargine,Hum.rec.anlog) 100 Unit/1 Ml Insuln.pen 26 

Unit SQ HS


Amitiza (Lubiprostone) 24 Mcg Capsule 24 Mcg PO DAILY


Klor-Con M20 (Potassium Chloride) 20 Meq Tab.er.prt 20 Meq PO DAILY


Benicar (Olmesartan Medoxomil) 5 Mg Tablet 5 Mg PO DAILY


Allopurinol 300 Mg Tablet 300 Mg PO DAILY


I have reviewed the current psychotropics carefully including drug 

interactions.  Risk benefit ratio favors no change other than as noted in my 

dictated progress note.





Diagnosis:


Problems:  


(1) Dementia


(2) MDD (major depressive disorder)


(3) Anxiety disorder


(4) Mild cognitive impairment











JULIANA CARDONA MD Sep 22, 2018 22:53

## 2018-09-23 VITALS — SYSTOLIC BLOOD PRESSURE: 178 MMHG | DIASTOLIC BLOOD PRESSURE: 81 MMHG

## 2018-09-23 VITALS — SYSTOLIC BLOOD PRESSURE: 124 MMHG | DIASTOLIC BLOOD PRESSURE: 74 MMHG

## 2018-09-23 LAB
T3 SERPL-MCNC: 91 NG/DL (ref 71–180)
T4 SERPL-MCNC: 6.6 UG/DL (ref 4.5–12)

## 2018-09-23 RX ADMIN — PANTOPRAZOLE SODIUM SCH MG: 40 TABLET, DELAYED RELEASE ORAL at 09:12

## 2018-09-23 RX ADMIN — IPRATROPIUM BROMIDE AND ALBUTEROL SULFATE SCH ML: .5; 3 SOLUTION RESPIRATORY (INHALATION) at 10:26

## 2018-09-23 RX ADMIN — CETIRIZINE HYDROCHLORIDE SCH MG: 10 TABLET, FILM COATED ORAL at 09:11

## 2018-09-23 RX ADMIN — HYDROCODONE BITARTRATE AND ACETAMINOPHEN SCH TAB: 5; 325 TABLET ORAL at 16:44

## 2018-09-23 RX ADMIN — LUBIPROSTONE SCH MCG: 24 CAPSULE, GELATIN COATED ORAL at 09:18

## 2018-09-23 RX ADMIN — CARBIDOPA AND LEVODOPA SCH TAB: 25; 250 TABLET ORAL at 00:33

## 2018-09-23 RX ADMIN — METHENAMINE HIPPURATE SCH GM: 1 TABLET ORAL at 20:58

## 2018-09-23 RX ADMIN — Medication PRN EACH: at 13:29

## 2018-09-23 RX ADMIN — METHENAMINE HIPPURATE SCH GM: 1 TABLET ORAL at 09:30

## 2018-09-23 RX ADMIN — IPRATROPIUM BROMIDE AND ALBUTEROL SULFATE SCH ML: .5; 3 SOLUTION RESPIRATORY (INHALATION) at 16:21

## 2018-09-23 RX ADMIN — CARBIDOPA AND LEVODOPA SCH TAB: 25; 250 TABLET ORAL at 17:15

## 2018-09-23 RX ADMIN — SERTRALINE HYDROCHLORIDE SCH MG: 50 TABLET ORAL at 09:12

## 2018-09-23 RX ADMIN — POTASSIUM CHLORIDE SCH MEQ: 1500 TABLET, EXTENDED RELEASE ORAL at 09:11

## 2018-09-23 RX ADMIN — LOSARTAN POTASSIUM SCH MG: 25 TABLET, FILM COATED ORAL at 09:13

## 2018-09-23 RX ADMIN — HYDROCODONE BITARTRATE AND ACETAMINOPHEN SCH TAB: 5; 325 TABLET ORAL at 20:57

## 2018-09-23 RX ADMIN — CEPHALEXIN SCH MG: 250 CAPSULE ORAL at 13:38

## 2018-09-23 RX ADMIN — ALLOPURINOL SCH MG: 300 TABLET ORAL at 09:12

## 2018-09-23 RX ADMIN — Medication SCH CAP: at 20:57

## 2018-09-23 RX ADMIN — INSULIN GLARGINE SCH UNITS: 100 INJECTION, SOLUTION SUBCUTANEOUS at 21:01

## 2018-09-23 RX ADMIN — CEPHALEXIN SCH MG: 250 CAPSULE ORAL at 20:57

## 2018-09-23 RX ADMIN — INSULIN LISPRO SCH UNITS: 100 INJECTION, SOLUTION INTRAVENOUS; SUBCUTANEOUS at 09:47

## 2018-09-23 RX ADMIN — IPRATROPIUM BROMIDE AND ALBUTEROL SULFATE SCH ML: .5; 3 SOLUTION RESPIRATORY (INHALATION) at 19:48

## 2018-09-23 RX ADMIN — BUDESONIDE SCH MG: 0.5 SUSPENSION RESPIRATORY (INHALATION) at 19:48

## 2018-09-23 RX ADMIN — AMANTADINE HYDROCHLORIDE SCH MG: 100 CAPSULE ORAL at 09:19

## 2018-09-23 RX ADMIN — Medication SCH CAP: at 09:12

## 2018-09-23 RX ADMIN — POLYETHYLENE GLYCOL 3350 SCH GM: 17 POWDER, FOR SOLUTION ORAL at 09:11

## 2018-09-23 RX ADMIN — AMANTADINE HYDROCHLORIDE SCH MG: 100 CAPSULE ORAL at 20:59

## 2018-09-23 RX ADMIN — OXYCODONE HYDROCHLORIDE AND ACETAMINOPHEN SCH MG: 500 TABLET ORAL at 20:57

## 2018-09-23 RX ADMIN — CARBIDOPA AND LEVODOPA SCH TAB: 25; 250 TABLET ORAL at 05:46

## 2018-09-23 RX ADMIN — CEPHALEXIN SCH MG: 250 CAPSULE ORAL at 09:12

## 2018-09-23 RX ADMIN — SENNOSIDES A AND B SCH MG: 8.6 TABLET, FILM COATED ORAL at 09:11

## 2018-09-23 RX ADMIN — WARFARIN SODIUM SCH MG: 1 TABLET ORAL at 16:44

## 2018-09-23 RX ADMIN — INSULIN LISPRO SCH UNITS: 100 INJECTION, SOLUTION INTRAVENOUS; SUBCUTANEOUS at 17:10

## 2018-09-23 RX ADMIN — METOPROLOL SUCCINATE SCH MG: 50 TABLET, EXTENDED RELEASE ORAL at 09:14

## 2018-09-23 RX ADMIN — OXYCODONE HYDROCHLORIDE AND ACETAMINOPHEN SCH MG: 500 TABLET ORAL at 09:12

## 2018-09-23 RX ADMIN — HYDROCODONE BITARTRATE AND ACETAMINOPHEN SCH TAB: 5; 325 TABLET ORAL at 13:39

## 2018-09-23 RX ADMIN — HYDROCODONE BITARTRATE AND ACETAMINOPHEN SCH TAB: 5; 325 TABLET ORAL at 09:20

## 2018-09-23 RX ADMIN — CARBIDOPA AND LEVODOPA SCH TAB: 25; 250 TABLET ORAL at 13:39

## 2018-09-23 RX ADMIN — IPRATROPIUM BROMIDE AND ALBUTEROL SULFATE SCH ML: .5; 3 SOLUTION RESPIRATORY (INHALATION) at 06:06

## 2018-09-23 RX ADMIN — BUDESONIDE SCH MG: 0.5 SUSPENSION RESPIRATORY (INHALATION) at 10:26

## 2018-09-23 RX ADMIN — MELATONIN TAB 3 MG SCH MG: 3 TAB at 20:57

## 2018-09-23 RX ADMIN — INSULIN LISPRO SCH UNITS: 100 INJECTION, SOLUTION INTRAVENOUS; SUBCUTANEOUS at 12:00

## 2018-09-23 NOTE — PDOC
Exam


Note:


[facility] Note:


Please also refer to the separate dictated note~for this date of service 

dictated separately.~Patient seen individually. Discussed the patient with 

Nursing staff reviewed the chart.~Reviewed interim history and current 

functioning. Reviewed vital signs,~Labs/ Radiology~and current medications 

noted below. Continue current treatment with the changes noted in the dictated 

addendum note





Assessment:


Vital Signs:





 Vital Signs








  Date Time  Temp Pulse Resp B/P (MAP) Pulse Ox O2 Delivery O2 Flow Rate FiO2


 


9/23/18 19:50     97 Room Air  


 


9/23/18 17:34   18     


 


9/23/18 16:21 98.0 67  178/81 (113)    








I&O











Intake and Output 


 


 9/23/18





 07:00


 


Intake Total 360 ml


 


Balance 360 ml


 


 


 


Intake Oral 360 ml








Labs:





 Laboratory Tests








Test


 9/23/18


07:23 9/23/18


07:24 9/23/18


12:59 9/23/18


16:32


 


Glucose (Fingerstick)


 153 mg/dL


(70-99)  H 


 192 mg/dL


(70-99)  H 192 mg/dL


(70-99)  H


 


Prothrombin Time


 


 22.3 SEC


(9.4-11.4)  H 


 





 


Prothrombin Time INR


 


 2.3 (0.9-1.1)


H 


 





 


Test


 9/23/18


19:14 


 


 





 


Glucose (Fingerstick)


 203 mg/dL


(70-99)  H 


 


 














Current Medications:


Meds:





Current Medications


Cephalexin HCl (Keflex) 500 mg 1X  ONCE PO  Last administered on 9/21/18at 23:21

;  Start 9/21/18 at 23:15;  Stop 9/21/18 at 23:16;  Status DC


Insulin Human Lispro (HumaLOG) 0-5 UNITS TIDWMEALS SQ  Last administered on 9/23 /18at 17:10;  Start 9/22/18 at 08:00


Dextrose 12.5 gm PRN Q15MIN  PRN IV SEE COMMENTS;  Start 9/21/18 at 23:00


Cephalexin HCl (Keflex) 500 mg TID PO  Last administered on 9/23/18at 13:38;  

Start 9/22/18 at 09:00;  Stop 9/29/18 at 08:59


Lorazepam (Ativan) 0.5 mg 1X  ONCE PO  Last administered on 9/21/18at 23:21;  

Start 9/21/18 at 23:15;  Stop 9/21/18 at 23:16;  Status DC


Famotidine (Pepcid) 20 mg 1X  ONCE PO  Last administered on 9/21/18at 23:21;  

Start 9/21/18 at 23:15;  Stop 9/21/18 at 23:16;  Status DC


Acetaminophen (Tylenol) 650 mg PRN Q6HRS  PRN PO PAIN / TEMP;  Start 9/22/18 at 

00:15


Multi-Ingredient Ointment (Analgesic Balm) 1 mami PRN QID  PRN TP MUSCLE PAIN;  

Start 9/22/18 at 00:15


Magnesium Hydroxide (Milk Of Magnesia) 2,400 mg PRN QHS  PRN PO CONSTIPATION;  

Start 9/22/18 at 00:15


Calcium Carbonate/ Glycine (Tums) 400 mg PRN TID  PRN PO INDIGESTION;  Start 9/ 22/18 at 00:15


Insulin Glargine (Lantus) 26 units HS SQ  Last administered on 9/22/18at 20:00;

  Start 9/22/18 at 21:00


Albuterol Sulfate (Ventolin) 2.5 mg PRN QID  PRN NEB SHORTNESS OF BREATH;  

Start 9/22/18 at 00:15


Lubiprostone (Amitiza) 24 mcg DAILY PO  Last administered on 9/23/18at 09:18;  

Start 9/22/18 at 09:00


Methenamine Hippurate (Hiprex) 1 gm BID PO  Last administered on 9/23/18at 09:30

;  Start 9/22/18 at 09:00


Potassium Chloride (Klor-Con) 20 meq DAILY PO  Last administered on 9/23/18at 09

:11;  Start 9/22/18 at 09:00


Simethicone (Gas-X) 80 mg PRN QID  PRN PO GAS / BLOATING;  Start 9/22/18 at 00:

15


Allopurinol (Zyloprim) 300 mg DAILY PO  Last administered on 9/23/18at 09:12;  

Start 9/22/18 at 09:00


Amantadine HCl (Symmetrel) 200 mg BID PO  Last administered on 9/23/18at 09:19;

  Start 9/22/18 at 09:00


Ascorbic Acid (Vitamin C) 500 mg BID PO  Last administered on 9/23/18at 09:12;  

Start 9/22/18 at 09:00


Carbidopa/Levodopa (Sinemet 25/250) 1 tab [id number] PO  Last administered on 9/ 23/18at 17:15;  Start 9/22/18 at 06:00


Artificial Tears (Artificial Tears) 1 drop PRN Q6HRS  PRN OU DRY EYE;  Start 9/ 22/18 at 00:15


Guaifenesin (Robitussin Dm) 5 ml PRN Q4HRS  PRN PO COUGH;  Start 9/22/18 at 00:

15


Acetaminophen/ Hydrocodone Bitart (Lortab 5/325) 1 tab PRN BID  PRN PO PAIN;  

Start 9/22/18 at 00:15


Acetaminophen/ Hydrocodone Bitart (Lortab 5/325) 1 tab QID PO  Last 

administered on 9/23/18at 16:44;  Start 9/22/18 at 09:00


Insulin Aspart (Novolog Mix 70-30) 5 units TIDWMEALS SQ ;  Start 9/22/18 at 08:

00


Cetirizine HCl (ZyrTEC) 10 mg DAILY PO  Last administered on 9/23/18at 09:11;  

Start 9/22/18 at 09:00


Al Hydroxide/Mg Hydroxide (Mylanta Plus Xs) 30 ml PRN Q6HRS  PRN PO DYSPEPSIA;  

Start 9/22/18 at 00:15


Meclizine HCl (Antivert) 25 mg PRN TID  PRN PO DIZZINESS;  Start 9/22/18 at 00:

15


Metoprolol Succinate (Toprol Xl) 100 mg DAILY PO  Last administered on 9/23/ 18at 09:14;  Start 9/22/18 at 09:00


Ondansetron HCl (Zofran Odt) 8 mg PRN TID  PRN PO NAUSEA/VOMITING;  Start 9/22/ 18 at 00:15


Polyethylene Glycol (miraLAX) 17 gm DAILY PO  Last administered on 9/23/18at 09:

11;  Start 9/22/18 at 09:00


Sennosides (Senna) 8.6 mg DAILY PO  Last administered on 9/23/18at 09:11;  

Start 9/22/18 at 09:00


Warfarin Sodium (Coumadin Per Pharmacy) 1 each PRN DAILY  PRN MC SEE COMMENTS 

Last administered on 9/23/18at 13:29;  Start 9/22/18 at 00:15


Warfarin Sodium (Coumadin) 1.5 mg DAILY PO ;  Start 9/22/18 at 09:00;  Status 

UNV


Simethicone (Gas-X) 80 mg PRN QID  PRN PO GAS / BLOATING;  Start 9/22/18 at 00:

15;  Status UNV


Non-Formulary Medication (Budesonide/ Formoterol Fumarate (Symbicort 160-4.5 

Mcg Inhaler)) 2 puff BID IH ;  Start 9/22/18 at 09:00;  Stop 9/22/18 at 09:00;  

Status DC


Losartan Potassium (Cozaar) 25 mg DAILY PO  Last administered on 9/23/18at 09:13

;  Start 9/22/18 at 09:00


Pantoprazole Sodium (Protonix) 40 mg DAILYAC PO  Last administered on 9/23/18at 

09:12;  Start 9/22/18 at 07:30


Non-Formulary Medication (Tiotropium Bromide (Spiriva Respimat)) 2.5 gm DAILY 

IH ;  Start 9/22/18 at 09:00;  Stop 9/22/18 at 09:00;  Status DC


Lorazepam (Ativan) 1 mg TID PO  Last administered on 9/23/18at 13:39;  Start 9/ 22/18 at 09:00


Sertraline HCl (Zoloft) 50 mg DAILY PO  Last administered on 9/23/18at 09:12;  

Start 9/22/18 at 09:00;  Stop 9/24/18 at 09:00


Info (Anti-Coagulation Monitoring By Pharmacy) 1 each PRN DAILY  PRN MC SEE 

COMMENTS;  Start 9/22/18 at 09:00


Albuterol/ Ipratropium (Duoneb) 3 ml RTQID NEB  Last administered on 9/23/18at 

19:48;  Start 9/22/18 at 08:00


Budesonide (Pulmicort) 0.5 mg RTBID NEB  Last administered on 9/23/18at 19:48;  

Start 9/22/18 at 08:00


Warfarin Sodium (Coumadin) 1.5 mg 1X WARF  ONCE PO  Last administered on 9/22/ 18at 17:08;  Start 9/22/18 at 16:00;  Stop 9/22/18 at 16:01;  Status DC


Warfarin Sodium (Coumadin) 0.5 mg 1X WARF  ONCE PO ;  Start 9/22/18 at 16:00;  

Stop 9/22/18 at 16:00;  Status DC


Lactobacillus Rhamnosus (Culturelle) 1 cap BID PO  Last administered on 9/23/ 18at 09:12;  Start 9/22/18 at 21:00


Carbidopa/ Levodopa/ Entacapone (Stalevo 100) 1 tab PRN BID  PRN PO TREMORS;  

Start 9/22/18 at 17:00;  Stop 9/22/18 at 17:03;  Status DC


Carbidopa/Levodopa (Sinemet 25/100) 1 tab PRN BID  PRN PO TREMORS;  Start 9/22/ 18 at 17:15


Melatonin 3 mg QHS PO  Last administered on 9/22/18at 19:58;  Start 9/22/18 at 

21:00


Warfarin Sodium (Coumadin) 1.5 mg DAILY16 PO  Last administered on 9/23/18at 16:

44;  Start 9/23/18 at 16:00


Sertraline HCl (Zoloft) 75 mg DAILY PO ;  Start 9/24/18 at 09:00





Active Scripts


Active


Reported


Omeprazole 20 Mg Capsule.dr 20 Mg PO DAILY


Claritin (Loratadine) 10 Mg Tablet 10 Mg PO DAILY


Ativan (Lorazepam) 1 Mg Tablet 1 Mg PO TID


Symbicort 160-4.5 Mcg Inhaler (Budesonide/Formoterol Fumarate) 10.2 Gm 

Hfa.aer.ad 2 Puff IH BID


Simethicone 80 Mg Tab.chew 80 Mg PO PRN QID PRN


Miralax (Polyethylene Glycol 3350) 17 Gm Powd.pack 17 Gm PO PRN DAILY PRN


Metoprolol Succinate ( Xl ) (Metoprolol Succinate) 100 Mg Tab.er.24h 100 Mg PO 

DAILY


Meclizine Hcl 25 Mg Tablet 25 Mg PO PRN TID PRN


Duoneb 0.5-3(2.5) Mg/3 Ml (Albuterol/Ipratropium) 3 Ml Ampul.neb 3 Ml NEB QID 

PRN


Artificial Tears Eye Drops (Dextran 70/Hypromellose) 15 Ml Drops 1 Drop EACHEYE 

QID PRN


Spiriva Respimat (Tiotropium Bromide) 4 Gm Mist.inhal 2.5 Gm IH DAILY


Vitamin C (Ascorbate Calcium) 500 Mg Tablet 500 Mg PO BID


Amantadine (Amantadine Hcl) 100 Mg Tablet 200 Mg PO BID


Tums (Calcium Carbonate) 200 Mg Tab.chew 400 Mg PO PRN TID PRN


Hiprex (Methenamine Hippurate) 1 Gm Tablet 1 Gm PO BID


Coumadin (Warfarin Sodium) 1 Mg Tablet 0.5 Mg PO DAILY


Coumadin (Warfarin Sodium) 1 Mg Tablet 1 Mg PO DAILY


Norco 5-325 Tablet (Hydrocodone Bit/Acetaminophen) 1 Each Tablet 1 Tab PO QID


Norco 5-325 Tablet (Hydrocodone Bit/Acetaminophen) 1 Each Tablet 1 Tab PO PRN 

BID PRN


Senna (Sennosides) 8.6 Mg Tablet 8.6 Mg PO DAILY


Dayana-Lanta Liquid (Mag Hydrox/Al Hydrox/Simeth) 355 Ml Oral.susp 30 Ml PO PRN 

Q6HRS PRN


Zofran (Ondansetron Hcl) 8 Mg Tablet 8 Mg PO PRN TID PRN


Zoloft (Sertraline Hcl) 50 Mg Tablet 50 Mg PO DAILY


Novolog (Insulin Aspart) 100 Unit/1 Ml Cartridge 5 Unit SQ TIDWMEALS


Robitussin Cough-Chest Dm Liq (Guaifenesin/Dextromethorphan) 237 Ml Liquid 5 Ml 

PO PRN Q4HRS PRN


Carbidopa-Levo  Mg Odt (Carbidopa/Levodopa) 1 Each Tab.rapdis 1 Tab PO QID


Simethicone 80 Mg Tab.chew 80 Mg PO PRN QID PRN


Lantus Solostar (Insulin Glargine,Hum.rec.anlog) 100 Unit/1 Ml Insuln.pen 26 

Unit SQ HS


Amitiza (Lubiprostone) 24 Mcg Capsule 24 Mcg PO DAILY


Klor-Con M20 (Potassium Chloride) 20 Meq Tab.er.prt 20 Meq PO DAILY


Benicar (Olmesartan Medoxomil) 5 Mg Tablet 5 Mg PO DAILY


Allopurinol 300 Mg Tablet 300 Mg PO DAILY


I have reviewed the current psychotropics carefully including drug 

interactions.  Risk benefit ratio favors no change other than as noted in my 

dictated progress note.





Diagnosis:


Problems:  


(1) Dementia


(2) UTI (urinary tract infection)


(3) MDD (major depressive disorder)


(4) Anxiety disorder


(5) Mild cognitive impairment











JULIANA CARDONA MD Sep 23, 2018 20:55

## 2018-09-24 VITALS — SYSTOLIC BLOOD PRESSURE: 138 MMHG | DIASTOLIC BLOOD PRESSURE: 76 MMHG

## 2018-09-24 VITALS — DIASTOLIC BLOOD PRESSURE: 80 MMHG | SYSTOLIC BLOOD PRESSURE: 151 MMHG

## 2018-09-24 RX ADMIN — POTASSIUM CHLORIDE SCH MEQ: 1500 TABLET, EXTENDED RELEASE ORAL at 08:03

## 2018-09-24 RX ADMIN — HYDROCODONE BITARTRATE AND ACETAMINOPHEN SCH TAB: 5; 325 TABLET ORAL at 16:20

## 2018-09-24 RX ADMIN — IPRATROPIUM BROMIDE AND ALBUTEROL SULFATE SCH ML: .5; 3 SOLUTION RESPIRATORY (INHALATION) at 21:07

## 2018-09-24 RX ADMIN — LOSARTAN POTASSIUM SCH MG: 25 TABLET, FILM COATED ORAL at 08:03

## 2018-09-24 RX ADMIN — CETIRIZINE HYDROCHLORIDE SCH MG: 10 TABLET, FILM COATED ORAL at 08:03

## 2018-09-24 RX ADMIN — INSULIN LISPRO SCH UNITS: 100 INJECTION, SOLUTION INTRAVENOUS; SUBCUTANEOUS at 17:00

## 2018-09-24 RX ADMIN — PANTOPRAZOLE SODIUM SCH MG: 40 TABLET, DELAYED RELEASE ORAL at 08:04

## 2018-09-24 RX ADMIN — SENNOSIDES A AND B SCH MG: 8.6 TABLET, FILM COATED ORAL at 08:04

## 2018-09-24 RX ADMIN — POLYETHYLENE GLYCOL 3350 SCH GM: 17 POWDER, FOR SOLUTION ORAL at 08:02

## 2018-09-24 RX ADMIN — ALLOPURINOL SCH MG: 300 TABLET ORAL at 08:03

## 2018-09-24 RX ADMIN — MELATONIN TAB 3 MG SCH MG: 3 TAB at 20:44

## 2018-09-24 RX ADMIN — CEPHALEXIN SCH MG: 250 CAPSULE ORAL at 08:03

## 2018-09-24 RX ADMIN — INSULIN LISPRO SCH UNITS: 100 INJECTION, SOLUTION INTRAVENOUS; SUBCUTANEOUS at 12:00

## 2018-09-24 RX ADMIN — IPRATROPIUM BROMIDE AND ALBUTEROL SULFATE SCH ML: .5; 3 SOLUTION RESPIRATORY (INHALATION) at 05:50

## 2018-09-24 RX ADMIN — METHENAMINE HIPPURATE SCH GM: 1 TABLET ORAL at 08:14

## 2018-09-24 RX ADMIN — CARBIDOPA AND LEVODOPA SCH TAB: 25; 250 TABLET ORAL at 14:18

## 2018-09-24 RX ADMIN — Medication SCH CAP: at 20:44

## 2018-09-24 RX ADMIN — Medication SCH CAP: at 08:02

## 2018-09-24 RX ADMIN — CEPHALEXIN SCH MG: 250 CAPSULE ORAL at 14:18

## 2018-09-24 RX ADMIN — HYDROCODONE BITARTRATE AND ACETAMINOPHEN SCH TAB: 5; 325 TABLET ORAL at 20:50

## 2018-09-24 RX ADMIN — SERTRALINE HYDROCHLORIDE SCH MG: 50 TABLET ORAL at 08:04

## 2018-09-24 RX ADMIN — OXYCODONE HYDROCHLORIDE AND ACETAMINOPHEN SCH MG: 500 TABLET ORAL at 08:03

## 2018-09-24 RX ADMIN — BUDESONIDE SCH MG: 0.5 SUSPENSION RESPIRATORY (INHALATION) at 10:37

## 2018-09-24 RX ADMIN — WARFARIN SODIUM SCH MG: 1 TABLET ORAL at 16:20

## 2018-09-24 RX ADMIN — HYDROCODONE BITARTRATE AND ACETAMINOPHEN SCH TAB: 5; 325 TABLET ORAL at 14:18

## 2018-09-24 RX ADMIN — SERTRALINE HYDROCHLORIDE SCH MG: 50 TABLET ORAL at 08:14

## 2018-09-24 RX ADMIN — INSULIN LISPRO SCH UNITS: 100 INJECTION, SOLUTION INTRAVENOUS; SUBCUTANEOUS at 08:00

## 2018-09-24 RX ADMIN — LUBIPROSTONE SCH MCG: 24 CAPSULE, GELATIN COATED ORAL at 08:13

## 2018-09-24 RX ADMIN — IPRATROPIUM BROMIDE AND ALBUTEROL SULFATE SCH ML: .5; 3 SOLUTION RESPIRATORY (INHALATION) at 10:37

## 2018-09-24 RX ADMIN — IPRATROPIUM BROMIDE AND ALBUTEROL SULFATE SCH ML: .5; 3 SOLUTION RESPIRATORY (INHALATION) at 16:23

## 2018-09-24 RX ADMIN — HYDROCODONE BITARTRATE AND ACETAMINOPHEN SCH TAB: 5; 325 TABLET ORAL at 08:17

## 2018-09-24 RX ADMIN — AMANTADINE HYDROCHLORIDE SCH MG: 100 CAPSULE ORAL at 09:00

## 2018-09-24 RX ADMIN — INSULIN GLARGINE SCH UNITS: 100 INJECTION, SOLUTION SUBCUTANEOUS at 20:52

## 2018-09-24 RX ADMIN — BUDESONIDE SCH MG: 0.5 SUSPENSION RESPIRATORY (INHALATION) at 21:07

## 2018-09-24 RX ADMIN — OXYCODONE HYDROCHLORIDE AND ACETAMINOPHEN SCH MG: 500 TABLET ORAL at 20:44

## 2018-09-24 RX ADMIN — CARBIDOPA AND LEVODOPA SCH TAB: 25; 250 TABLET ORAL at 00:32

## 2018-09-24 RX ADMIN — METOPROLOL SUCCINATE SCH MG: 50 TABLET, EXTENDED RELEASE ORAL at 08:04

## 2018-09-24 RX ADMIN — CEPHALEXIN SCH MG: 250 CAPSULE ORAL at 20:44

## 2018-09-24 RX ADMIN — AMANTADINE HYDROCHLORIDE SCH MG: 100 CAPSULE ORAL at 20:44

## 2018-09-24 RX ADMIN — METHENAMINE HIPPURATE SCH GM: 1 TABLET ORAL at 20:50

## 2018-09-24 RX ADMIN — CARBIDOPA AND LEVODOPA SCH TAB: 25; 250 TABLET ORAL at 16:20

## 2018-09-24 RX ADMIN — CARBIDOPA AND LEVODOPA SCH TAB: 25; 250 TABLET ORAL at 06:00

## 2018-09-24 NOTE — PN
DATE:  09/23/2018



This is a late entry for 09/23/2018 covers elements not covered in my initial

note.



SUBJECTIVE:  I met with the patient in the evening.  The patient slept 7 hours

previous night, slept in the chair most of the night, remains somewhat

depressed, withdrawn, but not aggressive.



REVIEW OF SYSTEMS:  Ambulation impaired.  She is in a wheelchair.  As I was

making rounds and as I went to meet with her, she said she wanted to be sure

that I met with her and somewhat anxious about this.  No CV, , pulmonary, eye,

ENT system symptoms on review.



MENTAL STATUS EXAM:  Reasonably oriented.  Speech is coherent, low in volume. 

Abstraction fair, computation impaired, language function intact.  Mood and

affect somewhat depressed.  No suicidal or homicidal ideation.  No clear

psychotic symptoms.



LABORATORY DATA:  Reviewed.



IMPRESSION:  Major depressive disorder, recurrent, rule out psychotic features;

anxiety disorder, unspecified, impulse control disorder, unspecified; urinary

tract infection, rest unchanged.



PLAN:  Continue current psychotropics.  Increase Zoloft from 50 mg a day to 75

mg a day.  Continue rest unchanged.  Ativan 1 mg t.i.d.., we will taper

gradually and melatonin 3 mg at bedtime.





______________________________

JULIANA CARDONA MD



DR:  RANCHO/angel  JOB#:  [id number] / [id number]

DD:  09/24/2018 12:24  DT:  09/24/2018 20:54

## 2018-09-24 NOTE — PDOC
Exam


Note:


[facility] Note:


Please also refer to the separate dictated note~for this date of service 

dictated separately.~Patient seen individually. Discussed the patient with 

Nursing staff reviewed the chart.~Reviewed interim history and current 

functioning. Reviewed vital signs,~Labs/ Radiology~and current medications 

noted below. Continue current treatment with the changes noted in the dictated 

addendum note





Assessment:


Vital Signs:





 Vital Signs








  Date Time  Temp Pulse Resp B/P (MAP) Pulse Ox O2 Delivery O2 Flow Rate FiO2


 


9/24/18 21:12     98 Room Air  


 


9/24/18 17:22   20     


 


9/24/18 16:03 97.4 68  151/80 (103)    








I&O











Intake and Output 


 


 9/24/18





 07:00


 


Intake Total 1080 ml


 


Output Total 1 ml


 


Balance 1079 ml


 


 


 


Intake Oral 1080 ml


 


Output Urine Total 1 ml








Labs:





 Laboratory Tests








Test


 9/24/18


07:23 9/24/18


11:12 9/24/18


16:24 9/24/18


20:02


 


Glucose (Fingerstick)


 89 mg/dL


(70-99) 127 mg/dL


(70-99)  H 159 mg/dL


(70-99)  H 164 mg/dL


(70-99)  H











Current Medications:


Meds:





Current Medications


Cephalexin HCl (Keflex) 500 mg 1X  ONCE PO  Last administered on 9/21/18at 23:21

;  Start 9/21/18 at 23:15;  Stop 9/21/18 at 23:16;  Status DC


Insulin Human Lispro (HumaLOG) 0-5 UNITS TIDWMEALS SQ  Last administered on 9/24 /18at 17:00;  Start 9/22/18 at 08:00


Dextrose 12.5 gm PRN Q15MIN  PRN IV SEE COMMENTS;  Start 9/21/18 at 23:00


Cephalexin HCl (Keflex) 500 mg TID PO  Last administered on 9/24/18at 20:44;  

Start 9/22/18 at 09:00;  Stop 9/29/18 at 08:59


Lorazepam (Ativan) 0.5 mg 1X  ONCE PO  Last administered on 9/21/18at 23:21;  

Start 9/21/18 at 23:15;  Stop 9/21/18 at 23:16;  Status DC


Famotidine (Pepcid) 20 mg 1X  ONCE PO  Last administered on 9/21/18at 23:21;  

Start 9/21/18 at 23:15;  Stop 9/21/18 at 23:16;  Status DC


Acetaminophen (Tylenol) 650 mg PRN Q6HRS  PRN PO PAIN / TEMP;  Start 9/22/18 at 

00:15


Multi-Ingredient Ointment (Analgesic Balm) 1 mami PRN QID  PRN TP MUSCLE PAIN;  

Start 9/22/18 at 00:15


Magnesium Hydroxide (Milk Of Magnesia) 2,400 mg PRN QHS  PRN PO CONSTIPATION;  

Start 9/22/18 at 00:15


Calcium Carbonate/ Glycine (Tums) 400 mg PRN TID  PRN PO INDIGESTION;  Start 9/ 22/18 at 00:15


Insulin Glargine (Lantus) 26 units HS SQ  Last administered on 9/24/18at 20:52;

  Start 9/22/18 at 21:00


Albuterol Sulfate (Ventolin) 2.5 mg PRN QID  PRN NEB SHORTNESS OF BREATH;  

Start 9/22/18 at 00:15


Lubiprostone (Amitiza) 24 mcg DAILY PO  Last administered on 9/24/18at 08:13;  

Start 9/22/18 at 09:00


Methenamine Hippurate (Hiprex) 1 gm BID PO  Last administered on 9/24/18at 20:50

;  Start 9/22/18 at 09:00


Potassium Chloride (Klor-Con) 20 meq DAILY PO  Last administered on 9/24/18at 08

:03;  Start 9/22/18 at 09:00


Simethicone (Gas-X) 80 mg PRN QID  PRN PO GAS / BLOATING;  Start 9/22/18 at 00:

15


Allopurinol (Zyloprim) 300 mg DAILY PO  Last administered on 9/24/18at 08:03;  

Start 9/22/18 at 09:00


Amantadine HCl (Symmetrel) 200 mg BID PO  Last administered on 9/24/18at 20:44;

  Start 9/22/18 at 09:00


Ascorbic Acid (Vitamin C) 500 mg BID PO  Last administered on 9/24/18at 20:44;  

Start 9/22/18 at 09:00


Carbidopa/Levodopa (Sinemet 25/250) 1 tab [id number] PO  Last administered on 9/ 24/18at 16:20;  Start 9/22/18 at 06:00


Artificial Tears (Artificial Tears) 1 drop PRN Q6HRS  PRN OU DRY EYE;  Start 9/ 22/18 at 00:15


Guaifenesin (Robitussin Dm) 5 ml PRN Q4HRS  PRN PO COUGH;  Start 9/22/18 at 00:

15


Acetaminophen/ Hydrocodone Bitart (Lortab 5/325) 1 tab PRN BID  PRN PO PAIN;  

Start 9/22/18 at 00:15


Acetaminophen/ Hydrocodone Bitart (Lortab 5/325) 1 tab QID PO  Last 

administered on 9/24/18at 20:50;  Start 9/22/18 at 09:00


Insulin Aspart (Novolog Mix 70-30) 5 units TIDWMEALS SQ  Last administered on 9/ 24/18at 17:00;  Start 9/22/18 at 08:00


Cetirizine HCl (ZyrTEC) 10 mg DAILY PO  Last administered on 9/24/18at 08:03;  

Start 9/22/18 at 09:00


Al Hydroxide/Mg Hydroxide (Mylanta Plus Xs) 30 ml PRN Q6HRS  PRN PO DYSPEPSIA;  

Start 9/22/18 at 00:15


Meclizine HCl (Antivert) 25 mg PRN TID  PRN PO DIZZINESS;  Start 9/22/18 at 00:

15


Metoprolol Succinate (Toprol Xl) 100 mg DAILY PO  Last administered on 9/24/ 18at 08:04;  Start 9/22/18 at 09:00


Ondansetron HCl (Zofran Odt) 8 mg PRN TID  PRN PO NAUSEA/VOMITING;  Start 9/22/ 18 at 00:15


Polyethylene Glycol (miraLAX) 17 gm DAILY PO  Last administered on 9/24/18at 08:

02;  Start 9/22/18 at 09:00


Sennosides (Senna) 8.6 mg DAILY PO  Last administered on 9/24/18at 08:04;  

Start 9/22/18 at 09:00


Warfarin Sodium (Coumadin Per Pharmacy) 1 each PRN DAILY  PRN MC SEE COMMENTS 

Last administered on 9/23/18at 13:29;  Start 9/22/18 at 00:15


Warfarin Sodium (Coumadin) 1.5 mg DAILY PO ;  Start 9/22/18 at 09:00;  Status 

UNV


Simethicone (Gas-X) 80 mg PRN QID  PRN PO GAS / BLOATING;  Start 9/22/18 at 00:

15;  Status UNV


Non-Formulary Medication (Budesonide/ Formoterol Fumarate (Symbicort 160-4.5 

Mcg Inhaler)) 2 puff BID IH ;  Start 9/22/18 at 09:00;  Stop 9/22/18 at 09:00;  

Status DC


Losartan Potassium (Cozaar) 25 mg DAILY PO  Last administered on 9/24/18at 08:03

;  Start 9/22/18 at 09:00


Pantoprazole Sodium (Protonix) 40 mg DAILYAC PO  Last administered on 9/24/18at 

08:04;  Start 9/22/18 at 07:30


Non-Formulary Medication (Tiotropium Bromide (Spiriva Respimat)) 2.5 gm DAILY 

IH ;  Start 9/22/18 at 09:00;  Stop 9/22/18 at 09:00;  Status DC


Lorazepam (Ativan) 1 mg TID PO  Last administered on 9/24/18at 20:50;  Start 9/ 22/18 at 09:00


Sertraline HCl (Zoloft) 50 mg DAILY PO  Last administered on 9/24/18at 08:04;  

Start 9/22/18 at 09:00;  Stop 9/24/18 at 09:00;  Status DC


Info (Anti-Coagulation Monitoring By Pharmacy) 1 each PRN DAILY  PRN MC SEE 

COMMENTS;  Start 9/22/18 at 09:00;  Status Cancel


Albuterol/ Ipratropium (Duoneb) 3 ml RTQID NEB  Last administered on 9/24/18at 

21:07;  Start 9/22/18 at 08:00


Budesonide (Pulmicort) 0.5 mg RTBID NEB  Last administered on 9/24/18at 21:07;  

Start 9/22/18 at 08:00


Warfarin Sodium (Coumadin) 1.5 mg 1X WARF  ONCE PO  Last administered on 9/22/ 18at 17:08;  Start 9/22/18 at 16:00;  Stop 9/22/18 at 16:01;  Status DC


Warfarin Sodium (Coumadin) 0.5 mg 1X WARF  ONCE PO ;  Start 9/22/18 at 16:00;  

Stop 9/22/18 at 16:00;  Status DC


Lactobacillus Rhamnosus (Culturelle) 1 cap BID PO  Last administered on 9/24/ 18at 20:44;  Start 9/22/18 at 21:00


Carbidopa/ Levodopa/ Entacapone (Stalevo 100) 1 tab PRN BID  PRN PO TREMORS;  

Start 9/22/18 at 17:00;  Stop 9/22/18 at 17:03;  Status DC


Carbidopa/Levodopa (Sinemet 25/100) 1 tab PRN BID  PRN PO TREMORS;  Start 9/22/ 18 at 17:15


Melatonin 3 mg QHS PO  Last administered on 9/24/18at 20:44;  Start 9/22/18 at 

21:00


Warfarin Sodium (Coumadin) 1.5 mg DAILY16 PO  Last administered on 9/24/18at 16:

20;  Start 9/23/18 at 16:00


Sertraline HCl (Zoloft) 75 mg DAILY PO  Last administered on 9/24/18at 08:14;  

Start 9/24/18 at 09:00





Active Scripts


Active


Reported


Omeprazole 20 Mg Capsule.dr 20 Mg PO DAILY


Claritin (Loratadine) 10 Mg Tablet 10 Mg PO DAILY


Ativan (Lorazepam) 1 Mg Tablet 1 Mg PO TID


Symbicort 160-4.5 Mcg Inhaler (Budesonide/Formoterol Fumarate) 10.2 Gm 

Hfa.aer.ad 2 Puff IH BID


Simethicone 80 Mg Tab.chew 80 Mg PO PRN QID PRN


Miralax (Polyethylene Glycol 3350) 17 Gm Powd.pack 17 Gm PO PRN DAILY PRN


Metoprolol Succinate ( Xl ) (Metoprolol Succinate) 100 Mg Tab.er.24h 100 Mg PO 

DAILY


Meclizine Hcl 25 Mg Tablet 25 Mg PO PRN TID PRN


Duoneb 0.5-3(2.5) Mg/3 Ml (Albuterol/Ipratropium) 3 Ml Ampul.neb 3 Ml NEB QID 

PRN


Artificial Tears Eye Drops (Dextran 70/Hypromellose) 15 Ml Drops 1 Drop EACHEYE 

QID PRN


Spiriva Respimat (Tiotropium Bromide) 4 Gm Mist.inhal 2.5 Gm IH DAILY


Vitamin C (Ascorbate Calcium) 500 Mg Tablet 500 Mg PO BID


Amantadine (Amantadine Hcl) 100 Mg Tablet 200 Mg PO BID


Tums (Calcium Carbonate) 200 Mg Tab.chew 400 Mg PO PRN TID PRN


Hiprex (Methenamine Hippurate) 1 Gm Tablet 1 Gm PO BID


Coumadin (Warfarin Sodium) 1 Mg Tablet 0.5 Mg PO DAILY


Coumadin (Warfarin Sodium) 1 Mg Tablet 1 Mg PO DAILY


Norco 5-325 Tablet (Hydrocodone Bit/Acetaminophen) 1 Each Tablet 1 Tab PO QID


Norco 5-325 Tablet (Hydrocodone Bit/Acetaminophen) 1 Each Tablet 1 Tab PO PRN 

BID PRN


Senna (Sennosides) 8.6 Mg Tablet 8.6 Mg PO DAILY


Dayana-Lanta Liquid (Mag Hydrox/Al Hydrox/Simeth) 355 Ml Oral.susp 30 Ml PO PRN 

Q6HRS PRN


Zofran (Ondansetron Hcl) 8 Mg Tablet 8 Mg PO PRN TID PRN


Zoloft (Sertraline Hcl) 50 Mg Tablet 50 Mg PO DAILY


Novolog (Insulin Aspart) 100 Unit/1 Ml Cartridge 5 Unit SQ TIDWMEALS


Robitussin Cough-Chest Dm Liq (Guaifenesin/Dextromethorphan) 237 Ml Liquid 5 Ml 

PO PRN Q4HRS PRN


Carbidopa-Levo  Mg Odt (Carbidopa/Levodopa) 1 Each Tab.rapdis 1 Tab PO QID


Simethicone 80 Mg Tab.chew 80 Mg PO PRN QID PRN


Lantus Solostar (Insulin Glargine,Hum.rec.anlog) 100 Unit/1 Ml Insuln.pen 26 

Unit SQ HS


Amitiza (Lubiprostone) 24 Mcg Capsule 24 Mcg PO DAILY


Klor-Con M20 (Potassium Chloride) 20 Meq Tab.er.prt 20 Meq PO DAILY


Benicar (Olmesartan Medoxomil) 5 Mg Tablet 5 Mg PO DAILY


Allopurinol 300 Mg Tablet 300 Mg PO DAILY


I have reviewed the current psychotropics carefully including drug 

interactions.  Risk benefit ratio favors no change other than as noted in my 

dictated progress note.





Diagnosis:


Problems:  


(1) Dementia


(2) MDD (major depressive disorder)


(3) Anxiety disorder


(4) Mild cognitive impairment











JULIANA CARDONA MD Sep 24, 2018 22:39

## 2018-09-25 VITALS — DIASTOLIC BLOOD PRESSURE: 76 MMHG | SYSTOLIC BLOOD PRESSURE: 164 MMHG

## 2018-09-25 VITALS — SYSTOLIC BLOOD PRESSURE: 144 MMHG | DIASTOLIC BLOOD PRESSURE: 79 MMHG

## 2018-09-25 VITALS — DIASTOLIC BLOOD PRESSURE: 71 MMHG | SYSTOLIC BLOOD PRESSURE: 179 MMHG

## 2018-09-25 RX ADMIN — AMANTADINE HYDROCHLORIDE SCH MG: 100 CAPSULE ORAL at 07:46

## 2018-09-25 RX ADMIN — CARBIDOPA AND LEVODOPA SCH TAB: 25; 250 TABLET ORAL at 00:34

## 2018-09-25 RX ADMIN — LUBIPROSTONE SCH MCG: 24 CAPSULE, GELATIN COATED ORAL at 07:47

## 2018-09-25 RX ADMIN — OXYCODONE HYDROCHLORIDE AND ACETAMINOPHEN SCH MG: 500 TABLET ORAL at 07:41

## 2018-09-25 RX ADMIN — INSULIN GLARGINE SCH UNITS: 100 INJECTION, SOLUTION SUBCUTANEOUS at 19:53

## 2018-09-25 RX ADMIN — HYDROCODONE BITARTRATE AND ACETAMINOPHEN SCH TAB: 5; 325 TABLET ORAL at 07:45

## 2018-09-25 RX ADMIN — AMANTADINE HYDROCHLORIDE SCH MG: 100 CAPSULE ORAL at 19:52

## 2018-09-25 RX ADMIN — CEPHALEXIN SCH MG: 250 CAPSULE ORAL at 07:42

## 2018-09-25 RX ADMIN — METHENAMINE HIPPURATE SCH GM: 1 TABLET ORAL at 07:46

## 2018-09-25 RX ADMIN — ALLOPURINOL SCH MG: 300 TABLET ORAL at 07:41

## 2018-09-25 RX ADMIN — BUDESONIDE SCH MG: 0.5 SUSPENSION RESPIRATORY (INHALATION) at 20:51

## 2018-09-25 RX ADMIN — POLYETHYLENE GLYCOL 3350 SCH GM: 17 POWDER, FOR SOLUTION ORAL at 07:40

## 2018-09-25 RX ADMIN — CARBIDOPA AND LEVODOPA SCH TAB: 25; 250 TABLET ORAL at 17:31

## 2018-09-25 RX ADMIN — HYDROCODONE BITARTRATE AND ACETAMINOPHEN SCH TAB: 5; 325 TABLET ORAL at 17:35

## 2018-09-25 RX ADMIN — WARFARIN SODIUM SCH MG: 1 TABLET ORAL at 17:32

## 2018-09-25 RX ADMIN — CARBIDOPA AND LEVODOPA SCH TAB: 25; 250 TABLET ORAL at 11:52

## 2018-09-25 RX ADMIN — SERTRALINE HYDROCHLORIDE SCH MG: 50 TABLET ORAL at 07:42

## 2018-09-25 RX ADMIN — HYDROCODONE BITARTRATE AND ACETAMINOPHEN SCH TAB: 5; 325 TABLET ORAL at 19:50

## 2018-09-25 RX ADMIN — SENNOSIDES A AND B SCH MG: 8.6 TABLET, FILM COATED ORAL at 07:41

## 2018-09-25 RX ADMIN — PANTOPRAZOLE SODIUM SCH MG: 40 TABLET, DELAYED RELEASE ORAL at 07:41

## 2018-09-25 RX ADMIN — METHENAMINE HIPPURATE SCH GM: 1 TABLET ORAL at 19:47

## 2018-09-25 RX ADMIN — CEPHALEXIN SCH MG: 250 CAPSULE ORAL at 13:32

## 2018-09-25 RX ADMIN — POTASSIUM CHLORIDE SCH MEQ: 1500 TABLET, EXTENDED RELEASE ORAL at 07:41

## 2018-09-25 RX ADMIN — INSULIN LISPRO SCH UNITS: 100 INJECTION, SOLUTION INTRAVENOUS; SUBCUTANEOUS at 11:54

## 2018-09-25 RX ADMIN — INSULIN LISPRO SCH UNITS: 100 INJECTION, SOLUTION INTRAVENOUS; SUBCUTANEOUS at 17:37

## 2018-09-25 RX ADMIN — IPRATROPIUM BROMIDE AND ALBUTEROL SULFATE SCH ML: .5; 3 SOLUTION RESPIRATORY (INHALATION) at 20:51

## 2018-09-25 RX ADMIN — IPRATROPIUM BROMIDE AND ALBUTEROL SULFATE SCH ML: .5; 3 SOLUTION RESPIRATORY (INHALATION) at 11:40

## 2018-09-25 RX ADMIN — Medication SCH CAP: at 19:47

## 2018-09-25 RX ADMIN — HYDROCODONE BITARTRATE AND ACETAMINOPHEN SCH TAB: 5; 325 TABLET ORAL at 11:58

## 2018-09-25 RX ADMIN — CARBIDOPA AND LEVODOPA SCH TAB: 25; 250 TABLET ORAL at 06:19

## 2018-09-25 RX ADMIN — CEPHALEXIN SCH MG: 250 CAPSULE ORAL at 19:48

## 2018-09-25 RX ADMIN — IPRATROPIUM BROMIDE AND ALBUTEROL SULFATE SCH ML: .5; 3 SOLUTION RESPIRATORY (INHALATION) at 16:42

## 2018-09-25 RX ADMIN — IPRATROPIUM BROMIDE AND ALBUTEROL SULFATE SCH ML: .5; 3 SOLUTION RESPIRATORY (INHALATION) at 05:38

## 2018-09-25 RX ADMIN — CETIRIZINE HYDROCHLORIDE SCH MG: 10 TABLET, FILM COATED ORAL at 07:41

## 2018-09-25 RX ADMIN — MELATONIN TAB 3 MG SCH MG: 3 TAB at 19:47

## 2018-09-25 RX ADMIN — METOPROLOL SUCCINATE SCH MG: 50 TABLET, EXTENDED RELEASE ORAL at 07:42

## 2018-09-25 RX ADMIN — OXYCODONE HYDROCHLORIDE AND ACETAMINOPHEN SCH MG: 500 TABLET ORAL at 19:48

## 2018-09-25 RX ADMIN — INSULIN LISPRO SCH UNITS: 100 INJECTION, SOLUTION INTRAVENOUS; SUBCUTANEOUS at 07:37

## 2018-09-25 RX ADMIN — LOSARTAN POTASSIUM SCH MG: 25 TABLET, FILM COATED ORAL at 07:41

## 2018-09-25 RX ADMIN — Medication SCH CAP: at 07:42

## 2018-09-25 RX ADMIN — BUDESONIDE SCH MG: 0.5 SUSPENSION RESPIRATORY (INHALATION) at 11:40

## 2018-09-25 NOTE — PN
DATE:  09/24/2018



This is a late entry, 09/24/2018, covers the elements not covered in my initial

note, 09/24/2018.



SUBJECTIVE:  I met with the patient in the evening.  The patient slept

reasonably well, otherwise cooperative with medications, remains somewhat

depressed.



REVIEW OF SYSTEMS:  Ambulation impaired, in wheelchair.  No CV, , pulmonary,

eye system symptoms on review.  She is still paranoid, suspicious.



MENTAL STATUS EXAM:  Reasonably oriented.  Speech coherent, pleasant, verbal. 

Abstraction fair.  Computation able to do one step on serial 7, remember 2/3

objects at 3 minutes.  Speech coherent, abstraction fair.  No active suicidal or

homicidal ideation.  Mood is still dysphoric, depressed, anxious.  Affect is

mood congruent.  Intellect average.  Insight good.  Judgment intact.



IMPRESSION:  Major depressive disorder, recurrent with psychotic features, in

partial remission, mild cognitive impairment, anxiety disorder, unspecified.



PLAN:  Continue psychotropics per initial note including Zoloft 75 mg a day,

melatonin 3 mg at bedtime, Ativan as 1 mg three time a day.  We will gradually

reduce it.  Rest unchanged for now.





______________________________

MAN ALEXANDR CARDONA MD



DR:  RANCHO/angel  JOB#:  [id number] / [id number]

DD:  09/25/2018 07:42  DT:  09/25/2018 19:48

## 2018-09-25 NOTE — PN
DATE:  09/24/2018



ADDENDUM



I just dictated the neurological evaluation and followup, I would like to go

back and remove the word dementia from the impression and from the past medical

history, please remove the word dementia in both cases in neurological

evaluation and a followup visit.



SUBJECTIVE:  The patient denies any new medical or neurological complaints.  She

continues to have mild tremor.  She stated that adding a ____ carbidopa and

levodopa, immediate release has helped some of her symptoms.  She denies chest

pain, shortness of breath, palpitation, dysarthria, and dysphagia.



OBJECTIVE:

GENERAL:  Well-developed, well-nourished white female, not in acute distress.

VITAL SIGNS:  Blood pressure 138/76, respiratory rate 16, pulse is 61 and

regular, temperature 97, oxygen saturation 96% on room air.

HEENT:  Normocephalic, atraumatic, otherwise unremarkable.

NECK:  Supple.  Negative for carotid bruit, lymphadenopathy or thyromegaly or

JVD.

LUNGS:  Clear to A and P.

CARDIOVASCULAR:  Regular rhythm, normal S1, S2.  There is no S3, S4 or murmur.

ABDOMEN:  Soft.  Bowel sounds positive.

EXTREMITIES:  Negative for cyanosis, clubbing or pitting edema.

NEUROLOGICAL EXAM:  Mental Status:  The patient is alert and oriented x 3.  The

speech is coherent and no language dysfunctions.  The patient recalls 2/3

immediately and after 1 and 3 minutes.  Judgment, abstract and thinking are

fair.  The patient denies hallucination or delusion.  Cranial nerves are intact.

 Motor Examination:  No focal muscle bulk was seen.  The strength is 4/5 and the

patient continues to have very mild resting tremor.  The strength is 4/5

throughout.  Sensory examination revealed diminished pinprick and light touch

senses in patchy distributions.  Deep tendon reflexes were symmetric and

hypoactive with absent Achilles responses.  Gait not tested.



IMPRESSION:

1.  Worsening of Parkinson disease, mainly "of phenomena" with some

improvement of resting tremor compared to that on the last few days.

2.  Multiple psychiatric problems include major depressions, anxiety and

possible paranoia.

3.  Multiple medical problems include hypertension, hyperlipidemia,

gastroesophageal reflux disease, diabetes mellitus type 2, chronic obstructive

pulmonary disease and history of stroke, urinary tract infections.



RECOMMENDATIONS:  Continue with current medical and psychiatric care along with

physical therapy evaluation.





______________________________

M DUANE NASCIMETNO MD



DR:  ROSMERY/angel  JOB#:  [id number] / [id number]

DD:  09/24/2018 23:25  DT:  09/25/2018 13:48

## 2018-09-25 NOTE — CONS
DATE OF CONSULTATION:  2018



REFERRING PHYSICIAN:  Minor Moseley M.D.



REASON FOR CONSULTATION:  Worsening of Parkinson's disease.



HISTORY OF PRESENT ILLNESS:  This is a 73-year-old  female who is a

resident Psychiatric, was [facility]

on account of worsening of paranoia and behavior disturbances.  Neuro consult

was requested because the patient has longstanding history of Parkinson disease

and worsening of the symptoms in the last few days.  It was reported that the

patient has been aggressive and paranoid.  She has had suicidal ideation and

wishes to die.  She believed that Gus is coming to help her.  The patient has

been confined to wheelchair due to gait disturbances and tendency to fall

secondary to Parkinson disease.  Currently, she denies chest pain, shortness of

breath or palpitation, dysarthria, dysphagia or vertigo.



PAST MEDICAL HISTORY:  Significant for Parkinson's disease, depression, anxiety,

paranoia, hypertension, dementia, diabetes mellitus, hyperlipidemia, pulmonary

embolism, chronic kidney disease, COPD, GERD, and history of stroke.



PAST SURGICAL HISTORY:  Positive for  and bilateral cataract

extraction.



SOCIAL HISTORY:  The patient lives at nursing home at [place].  She

denies smoking, alcohol drinking, or illicit drug use.



CURRENT MEDICATIONS:  Please see MRAD; include loratadine, Claritin, albuterol

inhaler, nebulizer, Benicar, hydrocodone, Zoloft, amantadine, carbidopa/levodopa

25/250 mg q.i.d., potassium, Amitiza.



ALLERGIES:  SULFA DRUGS and KETAMINE.



REVIEW OF SYSTEMS:  A 10-point review of system was performed as mentioned above

in history of present illness.



PHYSICAL EXAMINATION:

GENERAL:  Well-developed, well-nourished white female, not in acute distress. 

She weighs 189 pounds.

VITAL SIGNS:  Blood pressure 124/74, respiratory rate 20, pulse is _____ and

regular, temperature 97.6, oxygen saturation 98% on room air.

HEENT:  Normocephalic, atraumatic, otherwise unremarkable.

NECK:  Supple.  Negative for carotid bruit, lymphadenopathy or thyromegaly.

LUNGS:  Clear to A and P with diminished breath sounds.  No wheezing or rales.

CARDIOVASCULAR:  Regular rhythm, normal S1, S2.  There is no S3, S4, or murmur.

ABDOMEN:  Soft.  Bowel sounds positive.

EXTREMITIES:  Negative for cyanosis, clubbing, pitting edema.

NEUROLOGICAL:  MENTAL STATUS:  The patient is alert and oriented x 3.  The

speech is fluent.  There is no language dysfunction.  Memory, judgment, and

abstract thinking are fair.  The patient denies hallucination or delusion.

CRANIAL NERVES:  Visual fields are full.  The pupils are reactive to light and

accommodation.  The extraocular movements are intact.  There is no nystagmus. 

There is no facial, motor, or sensory deficit.  Hearing is intact bilaterally. 

The palate is elevated symmetrically.  Sternocleidomastoid muscles are powerful

bilaterally.  The patient shrugs her shoulders symmetrically, protrudes her

tongue in the midline without fasciculation or atrophy.

MOTOR:  No focal muscle bulk was seen.  The tone is normal in the upper

extremities, but is increased in the lower extremity bilaterally.  The patient

has resting tremor of both upper extremities.  Sensory examination revealed

diminished pinprick and light touch senses in patchy distributions in both lower

extremities.  Deep tendon reflexes were symmetric and hypoactive with absent

Achilles responses.  Gait:  The stance is unsteady.



LABORATORY DATA:  CBC revealed white blood cells of 9.5 thousand, hemoglobin

14.7, hematocrit 43.7, platelet count 261,000.  Chemistry revealed sodium of

141, potassium 4.2, chloride 104, CO2 of 29, BUN 26, creatinine 1, glucose is

134.  Hemoglobin A1c is 7.1.  Calcium 9.2.  Iron and TIBC normal.  Liver enzymes

are not elevated.  Troponin level is normal.  Lipid profile revealed elevated

triglyceride and LDL with high HDL at 61.  Vitamin B12 is normal at 569. 

Vitamin D is low at 10.1.  Thyroid profile is normal.  Urinalysis consistent

with urinary tract infections with small urinary leukocyte esterase and white

blood cells of 11-20.  Urine drug screen is positive for opiates.



IMPRESSION:

1.  Worsening of parkinsonian tremor and increased rigidity in the lower

extremities.  The patient stated she would have off/on phenomenon.

2.  Multiple psychiatric problems including dementia, depressions, paranoia, and

behavior disturbances.

3.  Multiple medical problems include diabetes mellitus type 2, hypertension,

hyperlipidemia, gastroesophageal reflux disease, chronic obstructive pulmonary

disease, history of stroke, and pulmonary embolism.



RECOMMENDATIONS:  We will add carbidopa/levodopa 25/100 b.i.d. as needed when

she has a p.r.n. with off phenomena and continue with existing

carbidopa/levodopa 25/250 q.i.d. along with amantadine.  If any worsening, we

will add another dopamine agonist as Mirapex or ropinirole with a small starting

dose.  Continue with current medical, psychiatric care, and physical therapy

evaluation.





______________________________

M DUANE NASCIMENTO MD



DR:  ROSMERY/angel  JOB#:  [id number] / [id number]

DD:  2018 23:10  DT:  2018 11:54

## 2018-09-25 NOTE — PN
DATE:  09/23/2018



SUBJECTIVE:  The patient continued to have a resting tremor of the upper

extremity of moderate severity.  She stated she feels somewhat better after

given a booster of carbidopa/levodopa immediate release 25/100 on p.r.n. basis. 

She denies headaches, visual disturbances, nausea, vomiting, chest pain,

shortness of breath or palpitation.



OBJECTIVE:

GENERAL:  Well-developed, well-nourished female, not in acute distress.  She

sits in a chair most of the time.

VITAL SIGNS:  Blood pressure 124/74, respiratory rate 20, pulse is 69 and

regular, temperature 97.6, oxygen saturation 98% on room air.

HEENT:  Normocephalic, atraumatic, otherwise unremarkable.

NECK:  Supple, negative for carotid bruit, lymphadenopathy or thyromegaly.

LUNGS:  Clear to A and P.

CARDIOVASCULAR:  Regular rhythm.  Normal S1, S2.

ABDOMEN:  Soft.  Bowel sounds positive.

EXTREMITIES:  Negative for cyanosis, clubbing or pitting edema.

NEUROLOGICAL EXAM:  Mental Status:  The patient is alert and oriented x 3. 

Speech is fluent.  There is no language dysfunction.  Memory, judgement and

abstract thinking are fair.  The patient denies hallucination or delusion. 

Cranial nerves are intact.  Motor examination:  The patient has a resting tremor

of both hands and arms, but mild postural and kinetic tremors as well.  The tone

is increased in the lower extremities.  The strength is 4/5 throughout.  Sensory

examination revealed diminished pinprick and light touch senses in patchy

distributions in both lower extremities.  Deep tendon reflexes were symmetric

and hypoactive with absent Achilles responses.  Gait not tested.



Fingerstick glucose is 203.



IMPRESSION:

1.  Longstanding history of Parkinson's with current resting tremor and

increased tone in the lower extremities.  The patient has been on a booster of

carbidopa/levodopa immediate at 25/100 mg.

2.  Multiple psychiatric problems include dementia, behavior disturbances,

paranoia, depression and anxiety.

3.  Multiple medical problems include Parkinson disease, urinary tract

infection, hypertension, hyperlipidemia, and gastroesophageal reflux disease.



RECOMMENDATIONS:

1.  Continue with carbidopa/levodopa including the immediate release on p.r.n.

basis.

2.  Continue with current medical and psychiatric care.  The patient needs urine

for culture and sensitivity.

3.  Physical therapy evaluation.





______________________________

M DUANE NASCIMENTO MD



DR:  Mateo  JOB#:  [id number] / [id number]

DD:  09/24/2018 23:17  DT:  09/25/2018 12:20

## 2018-09-25 NOTE — PDOC
Exam


Note:


[facility] Note:


Please also refer to the separate dictated note~for this date of service 

dictated separately.~Patient seen individually. Discussed the patient with 

Nursing staff reviewed the chart.~Reviewed interim history and current 

functioning. Reviewed vital signs,~Labs/ Radiology~and current medications 

noted below. Continue current treatment with the changes noted in the dictated 

addendum note





Assessment:


Vital Signs:





 Vital Signs








  Date Time  Temp Pulse Resp B/P (MAP) Pulse Ox O2 Delivery O2 Flow Rate FiO2


 


9/25/18 19:50     97 Room Air  


 


9/25/18 15:40 97.3 73 16 164/76 (105)    








I&O











Intake and Output 


 


 9/25/18





 07:00


 


Intake Total 720 ml


 


Output Total 1 ml


 


Balance 719 ml


 


 


 


Intake Oral 720 ml


 


Output Urine Total 1 ml








Labs:





 Laboratory Tests








Test


 9/25/18


07:31 9/25/18


11:21 9/25/18


16:25 9/25/18


19:05


 


Glucose (Fingerstick)


 101 mg/dL


(70-99)  H 159 mg/dL


(70-99)  H 178 mg/dL


(70-99)  H 168 mg/dL


(70-99)  H


 


Test


 9/25/18


20:18 


 


 





 


Glucose (Fingerstick)


 90 mg/dL


(70-99) 


 


 














Current Medications:


Meds:





Current Medications


Cephalexin HCl (Keflex) 500 mg 1X  ONCE PO  Last administered on 9/21/18at 23:21

;  Start 9/21/18 at 23:15;  Stop 9/21/18 at 23:16;  Status DC


Insulin Human Lispro (HumaLOG) 0-5 UNITS TIDWMEALS SQ  Last administered on 9/25 /18at 11:54;  Start 9/22/18 at 08:00;  Stop 9/25/18 at 13:54;  Status DC


Dextrose 12.5 gm PRN Q15MIN  PRN IV SEE COMMENTS;  Start 9/21/18 at 23:00


Cephalexin HCl (Keflex) 500 mg TID PO  Last administered on 9/25/18at 19:48;  

Start 9/22/18 at 09:00;  Stop 9/29/18 at 08:59


Lorazepam (Ativan) 0.5 mg 1X  ONCE PO  Last administered on 9/21/18at 23:21;  

Start 9/21/18 at 23:15;  Stop 9/21/18 at 23:16;  Status DC


Famotidine (Pepcid) 20 mg 1X  ONCE PO  Last administered on 9/21/18at 23:21;  

Start 9/21/18 at 23:15;  Stop 9/21/18 at 23:16;  Status DC


Acetaminophen (Tylenol) 650 mg PRN Q6HRS  PRN PO PAIN / TEMP;  Start 9/22/18 at 

00:15


Multi-Ingredient Ointment (Analgesic Balm) 1 mami PRN QID  PRN TP MUSCLE PAIN;  

Start 9/22/18 at 00:15


Magnesium Hydroxide (Milk Of Magnesia) 2,400 mg PRN QHS  PRN PO CONSTIPATION;  

Start 9/22/18 at 00:15


Calcium Carbonate/ Glycine (Tums) 400 mg PRN TID  PRN PO INDIGESTION;  Start 9/ 22/18 at 00:15


Insulin Glargine (Lantus) 26 units HS SQ  Last administered on 9/25/18at 19:53;

  Start 9/22/18 at 21:00


Albuterol Sulfate (Ventolin) 2.5 mg PRN QID  PRN NEB SHORTNESS OF BREATH;  

Start 9/22/18 at 00:15


Lubiprostone (Amitiza) 24 mcg DAILY PO  Last administered on 9/25/18at 07:47;  

Start 9/22/18 at 09:00


Methenamine Hippurate (Hiprex) 1 gm BID PO  Last administered on 9/25/18at 19:47

;  Start 9/22/18 at 09:00


Potassium Chloride (Klor-Con) 20 meq DAILY PO  Last administered on 9/25/18at 07

:41;  Start 9/22/18 at 09:00


Simethicone (Gas-X) 80 mg PRN QID  PRN PO GAS / BLOATING;  Start 9/22/18 at 00:

15


Allopurinol (Zyloprim) 300 mg DAILY PO  Last administered on 9/25/18at 07:41;  

Start 9/22/18 at 09:00


Amantadine HCl (Symmetrel) 200 mg BID PO  Last administered on 9/25/18at 19:52;

  Start 9/22/18 at 09:00


Ascorbic Acid (Vitamin C) 500 mg BID PO  Last administered on 9/25/18at 19:48;  

Start 9/22/18 at 09:00


Carbidopa/Levodopa (Sinemet 25/250) 1 tab [id number] PO  Last administered on 9/ 25/18at 17:31;  Start 9/22/18 at 06:00


Artificial Tears (Artificial Tears) 1 drop PRN Q6HRS  PRN OU DRY EYE;  Start 9/ 22/18 at 00:15


Guaifenesin (Robitussin Dm) 5 ml PRN Q4HRS  PRN PO COUGH;  Start 9/22/18 at 00:

15


Acetaminophen/ Hydrocodone Bitart (Lortab 5/325) 1 tab PRN BID  PRN PO PAIN;  

Start 9/22/18 at 00:15


Acetaminophen/ Hydrocodone Bitart (Lortab 5/325) 1 tab QID PO  Last 

administered on 9/25/18at 19:50;  Start 9/22/18 at 09:00


Insulin Aspart (Novolog Mix 70-30) 5 units TIDWMEALS SQ  Last administered on 9/ 24/18at 17:00;  Start 9/22/18 at 08:00;  Stop 9/25/18 at 11:59;  Status DC


Cetirizine HCl (ZyrTEC) 10 mg DAILY PO  Last administered on 9/25/18at 07:41;  

Start 9/22/18 at 09:00


Al Hydroxide/Mg Hydroxide (Mylanta Plus Xs) 30 ml PRN Q6HRS  PRN PO DYSPEPSIA;  

Start 9/22/18 at 00:15


Meclizine HCl (Antivert) 25 mg PRN TID  PRN PO DIZZINESS;  Start 9/22/18 at 00:

15


Metoprolol Succinate (Toprol Xl) 100 mg DAILY PO  Last administered on 9/25/ 18at 07:42;  Start 9/22/18 at 09:00


Ondansetron HCl (Zofran Odt) 8 mg PRN TID  PRN PO NAUSEA/VOMITING;  Start 9/22/ 18 at 00:15


Polyethylene Glycol (miraLAX) 17 gm DAILY PO  Last administered on 9/25/18at 07:

40;  Start 9/22/18 at 09:00


Sennosides (Senna) 8.6 mg DAILY PO  Last administered on 9/25/18at 07:41;  

Start 9/22/18 at 09:00


Warfarin Sodium (Coumadin Per Pharmacy) 1 each PRN DAILY  PRN MC SEE COMMENTS 

Last administered on 9/23/18at 13:29;  Start 9/22/18 at 00:15


Warfarin Sodium (Coumadin) 1.5 mg DAILY PO ;  Start 9/22/18 at 09:00;  Status 

UNV


Simethicone (Gas-X) 80 mg PRN QID  PRN PO GAS / BLOATING;  Start 9/22/18 at 00:

15;  Status UNV


Non-Formulary Medication (Budesonide/ Formoterol Fumarate (Symbicort 160-4.5 

Mcg Inhaler)) 2 puff BID IH ;  Start 9/22/18 at 09:00;  Stop 9/22/18 at 09:00;  

Status DC


Losartan Potassium (Cozaar) 25 mg DAILY PO  Last administered on 9/25/18at 07:41

;  Start 9/22/18 at 09:00


Pantoprazole Sodium (Protonix) 40 mg DAILYAC PO  Last administered on 9/25/18at 

07:41;  Start 9/22/18 at 07:30


Non-Formulary Medication (Tiotropium Bromide (Spiriva Respimat)) 2.5 gm DAILY 

IH ;  Start 9/22/18 at 09:00;  Stop 9/22/18 at 09:00;  Status DC


Lorazepam (Ativan) 1 mg TID PO  Last administered on 9/25/18at 19:51;  Start 9/ 22/18 at 09:00


Sertraline HCl (Zoloft) 50 mg DAILY PO  Last administered on 9/24/18at 08:04;  

Start 9/22/18 at 09:00;  Stop 9/24/18 at 09:00;  Status DC


Info (Anti-Coagulation Monitoring By Pharmacy) 1 each PRN DAILY  PRN MC SEE 

COMMENTS;  Start 9/22/18 at 09:00;  Status Cancel


Albuterol/ Ipratropium (Duoneb) 3 ml RTQID NEB  Last administered on 9/25/18at 

16:42;  Start 9/22/18 at 08:00


Budesonide (Pulmicort) 0.5 mg RTBID NEB  Last administered on 9/25/18at 11:40;  

Start 9/22/18 at 08:00


Warfarin Sodium (Coumadin) 1.5 mg 1X WARF  ONCE PO  Last administered on 9/22/ 18at 17:08;  Start 9/22/18 at 16:00;  Stop 9/22/18 at 16:01;  Status DC


Warfarin Sodium (Coumadin) 0.5 mg 1X WARF  ONCE PO ;  Start 9/22/18 at 16:00;  

Stop 9/22/18 at 16:00;  Status DC


Lactobacillus Rhamnosus (Culturelle) 1 cap BID PO  Last administered on 9/25/ 18at 19:47;  Start 9/22/18 at 21:00


Carbidopa/ Levodopa/ Entacapone (Stalevo 100) 1 tab PRN BID  PRN PO TREMORS;  

Start 9/22/18 at 17:00;  Stop 9/22/18 at 17:03;  Status DC


Carbidopa/Levodopa (Sinemet 25/100) 1 tab PRN BID  PRN PO TREMORS;  Start 9/22/ 18 at 17:15


Melatonin 3 mg QHS PO  Last administered on 9/25/18at 19:47;  Start 9/22/18 at 

21:00


Warfarin Sodium (Coumadin) 1.5 mg DAILY16 PO  Last administered on 9/25/18at 17:

32;  Start 9/23/18 at 16:00


Sertraline HCl (Zoloft) 75 mg DAILY PO  Last administered on 9/25/18at 07:42;  

Start 9/24/18 at 09:00


Insulin Human Isoph/Insulin Regular (HumuLIN 70/30) 5 units TIDWMEALS SQ ;  

Start 9/25/18 at 12:00;  Stop 9/25/18 at 13:53;  Status DC


Insulin Human Lispro (HumaLOG) 5 units TIDWMEALS SQ  Last administered on 9/25/ 18at 17:37;  Start 9/25/18 at 17:00





Active Scripts


Active


Reported


Omeprazole 20 Mg Capsule.dr 20 Mg PO DAILY


Claritin (Loratadine) 10 Mg Tablet 10 Mg PO DAILY


Ativan (Lorazepam) 1 Mg Tablet 1 Mg PO TID


Symbicort 160-4.5 Mcg Inhaler (Budesonide/Formoterol Fumarate) 10.2 Gm 

Hfa.aer.ad 2 Puff IH BID


Simethicone 80 Mg Tab.chew 80 Mg PO PRN QID PRN


Miralax (Polyethylene Glycol 3350) 17 Gm Powd.pack 17 Gm PO PRN DAILY PRN


Metoprolol Succinate ( Xl ) (Metoprolol Succinate) 100 Mg Tab.er.24h 100 Mg PO 

DAILY


Meclizine Hcl 25 Mg Tablet 25 Mg PO PRN TID PRN


Duoneb 0.5-3(2.5) Mg/3 Ml (Albuterol/Ipratropium) 3 Ml Ampul.neb 3 Ml NEB QID 

PRN


Artificial Tears Eye Drops (Dextran 70/Hypromellose) 15 Ml Drops 1 Drop EACHEYE 

QID PRN


Spiriva Respimat (Tiotropium Bromide) 4 Gm Mist.inhal 2.5 Gm IH DAILY


Vitamin C (Ascorbate Calcium) 500 Mg Tablet 500 Mg PO BID


Amantadine (Amantadine Hcl) 100 Mg Tablet 200 Mg PO BID


Tums (Calcium Carbonate) 200 Mg Tab.chew 400 Mg PO PRN TID PRN


Hiprex (Methenamine Hippurate) 1 Gm Tablet 1 Gm PO BID


Coumadin (Warfarin Sodium) 1 Mg Tablet 0.5 Mg PO DAILY


Coumadin (Warfarin Sodium) 1 Mg Tablet 1 Mg PO DAILY


Norco 5-325 Tablet (Hydrocodone Bit/Acetaminophen) 1 Each Tablet 1 Tab PO QID


Norco 5-325 Tablet (Hydrocodone Bit/Acetaminophen) 1 Each Tablet 1 Tab PO PRN 

BID PRN


Senna (Sennosides) 8.6 Mg Tablet 8.6 Mg PO DAILY


Dayana-Lanta Liquid (Mag Hydrox/Al Hydrox/Simeth) 355 Ml Oral.susp 30 Ml PO PRN 

Q6HRS PRN


Zofran (Ondansetron Hcl) 8 Mg Tablet 8 Mg PO PRN TID PRN


Zoloft (Sertraline Hcl) 50 Mg Tablet 50 Mg PO DAILY


Novolog (Insulin Aspart) 100 Unit/1 Ml Cartridge 5 Unit SQ TIDWMEALS


Robitussin Cough-Chest Dm Liq (Guaifenesin/Dextromethorphan) 237 Ml Liquid 5 Ml 

PO PRN Q4HRS PRN


Carbidopa-Levo  Mg Odt (Carbidopa/Levodopa) 1 Each Tab.rapdis 1 Tab PO QID


Simethicone 80 Mg Tab.chew 80 Mg PO PRN QID PRN


Lantus Solostar (Insulin Glargine,Hum.rec.anlog) 100 Unit/1 Ml Insuln.pen 26 

Unit SQ HS


Amitiza (Lubiprostone) 24 Mcg Capsule 24 Mcg PO DAILY


Klor-Con M20 (Potassium Chloride) 20 Meq Tab.er.prt 20 Meq PO DAILY


Benicar (Olmesartan Medoxomil) 5 Mg Tablet 5 Mg PO DAILY


Allopurinol 300 Mg Tablet 300 Mg PO DAILY


I have reviewed the current psychotropics carefully including drug 

interactions.  Risk benefit ratio favors no change other than as noted in my 

dictated progress note.





Diagnosis:


Problems:  


(1) Dementia


(2) UTI (urinary tract infection)


(3) MDD (major depressive disorder)


(4) Anxiety disorder


(5) Mild cognitive impairment











JULIANA CARDONA MD Sep 25, 2018 20:49

## 2018-09-26 VITALS — SYSTOLIC BLOOD PRESSURE: 151 MMHG | DIASTOLIC BLOOD PRESSURE: 82 MMHG

## 2018-09-26 VITALS — DIASTOLIC BLOOD PRESSURE: 68 MMHG | SYSTOLIC BLOOD PRESSURE: 123 MMHG

## 2018-09-26 RX ADMIN — OXYCODONE HYDROCHLORIDE AND ACETAMINOPHEN SCH MG: 500 TABLET ORAL at 19:14

## 2018-09-26 RX ADMIN — BUDESONIDE SCH MG: 0.5 SUSPENSION RESPIRATORY (INHALATION) at 08:00

## 2018-09-26 RX ADMIN — CETIRIZINE HYDROCHLORIDE SCH MG: 10 TABLET, FILM COATED ORAL at 08:23

## 2018-09-26 RX ADMIN — AMANTADINE HYDROCHLORIDE SCH MG: 100 CAPSULE ORAL at 08:28

## 2018-09-26 RX ADMIN — CEPHALEXIN SCH MG: 250 CAPSULE ORAL at 08:20

## 2018-09-26 RX ADMIN — IPRATROPIUM BROMIDE AND ALBUTEROL SULFATE SCH ML: .5; 3 SOLUTION RESPIRATORY (INHALATION) at 20:00

## 2018-09-26 RX ADMIN — CARBIDOPA AND LEVODOPA SCH TAB: 25; 250 TABLET ORAL at 17:28

## 2018-09-26 RX ADMIN — POLYETHYLENE GLYCOL 3350 SCH GM: 17 POWDER, FOR SOLUTION ORAL at 08:21

## 2018-09-26 RX ADMIN — IPRATROPIUM BROMIDE AND ALBUTEROL SULFATE SCH ML: .5; 3 SOLUTION RESPIRATORY (INHALATION) at 16:43

## 2018-09-26 RX ADMIN — OXYCODONE HYDROCHLORIDE AND ACETAMINOPHEN SCH MG: 500 TABLET ORAL at 08:22

## 2018-09-26 RX ADMIN — BUDESONIDE SCH MG: 0.5 SUSPENSION RESPIRATORY (INHALATION) at 20:00

## 2018-09-26 RX ADMIN — HYDROCODONE BITARTRATE AND ACETAMINOPHEN SCH TAB: 5; 325 TABLET ORAL at 12:41

## 2018-09-26 RX ADMIN — INSULIN LISPRO SCH UNITS: 100 INJECTION, SOLUTION INTRAVENOUS; SUBCUTANEOUS at 12:38

## 2018-09-26 RX ADMIN — CEPHALEXIN SCH MG: 250 CAPSULE ORAL at 19:13

## 2018-09-26 RX ADMIN — INSULIN GLARGINE SCH UNITS: 100 INJECTION, SOLUTION SUBCUTANEOUS at 19:15

## 2018-09-26 RX ADMIN — HYDROCODONE BITARTRATE AND ACETAMINOPHEN SCH TAB: 5; 325 TABLET ORAL at 17:28

## 2018-09-26 RX ADMIN — IPRATROPIUM BROMIDE AND ALBUTEROL SULFATE SCH ML: .5; 3 SOLUTION RESPIRATORY (INHALATION) at 05:12

## 2018-09-26 RX ADMIN — IPRATROPIUM BROMIDE AND ALBUTEROL SULFATE SCH ML: .5; 3 SOLUTION RESPIRATORY (INHALATION) at 11:41

## 2018-09-26 RX ADMIN — CARBIDOPA AND LEVODOPA SCH TAB: 25; 250 TABLET ORAL at 00:20

## 2018-09-26 RX ADMIN — POTASSIUM CHLORIDE SCH MEQ: 1500 TABLET, EXTENDED RELEASE ORAL at 08:20

## 2018-09-26 RX ADMIN — METHENAMINE HIPPURATE SCH GM: 1 TABLET ORAL at 19:12

## 2018-09-26 RX ADMIN — SENNOSIDES A AND B SCH MG: 8.6 TABLET, FILM COATED ORAL at 08:21

## 2018-09-26 RX ADMIN — INSULIN LISPRO SCH UNITS: 100 INJECTION, SOLUTION INTRAVENOUS; SUBCUTANEOUS at 08:15

## 2018-09-26 RX ADMIN — HYDROCODONE BITARTRATE AND ACETAMINOPHEN SCH TAB: 5; 325 TABLET ORAL at 08:28

## 2018-09-26 RX ADMIN — CARBIDOPA AND LEVODOPA SCH TAB: 25; 250 TABLET ORAL at 12:36

## 2018-09-26 RX ADMIN — PANTOPRAZOLE SODIUM SCH MG: 40 TABLET, DELAYED RELEASE ORAL at 08:13

## 2018-09-26 RX ADMIN — HYDROCODONE BITARTRATE AND ACETAMINOPHEN SCH TAB: 5; 325 TABLET ORAL at 19:13

## 2018-09-26 RX ADMIN — MELATONIN TAB 3 MG SCH MG: 3 TAB at 19:12

## 2018-09-26 RX ADMIN — METHENAMINE HIPPURATE SCH GM: 1 TABLET ORAL at 08:30

## 2018-09-26 RX ADMIN — SERTRALINE HYDROCHLORIDE SCH MG: 50 TABLET ORAL at 08:22

## 2018-09-26 RX ADMIN — Medication SCH CAP: at 08:18

## 2018-09-26 RX ADMIN — CEPHALEXIN SCH MG: 250 CAPSULE ORAL at 12:42

## 2018-09-26 RX ADMIN — Medication PRN EACH: at 10:59

## 2018-09-26 RX ADMIN — AMANTADINE HYDROCHLORIDE SCH MG: 100 CAPSULE ORAL at 19:13

## 2018-09-26 RX ADMIN — METOPROLOL SUCCINATE SCH MG: 50 TABLET, EXTENDED RELEASE ORAL at 08:22

## 2018-09-26 RX ADMIN — INSULIN LISPRO SCH UNITS: 100 INJECTION, SOLUTION INTRAVENOUS; SUBCUTANEOUS at 17:27

## 2018-09-26 RX ADMIN — CARBIDOPA AND LEVODOPA SCH TAB: 25; 250 TABLET ORAL at 06:07

## 2018-09-26 RX ADMIN — LUBIPROSTONE SCH MCG: 24 CAPSULE, GELATIN COATED ORAL at 08:30

## 2018-09-26 RX ADMIN — Medication SCH CAP: at 19:13

## 2018-09-26 RX ADMIN — ALLOPURINOL SCH MG: 300 TABLET ORAL at 08:23

## 2018-09-26 RX ADMIN — LOSARTAN POTASSIUM SCH MG: 25 TABLET, FILM COATED ORAL at 08:18

## 2018-09-26 NOTE — PDOC
Exam


Note:


[facility] Note:


Please also refer to the separate dictated note~for this date of service 

dictated separately.~Patient seen individually. Discussed the patient with 

Nursing staff reviewed the chart.~Reviewed interim history and current 

functioning. Reviewed vital signs,~Labs/ Radiology~and current medications 

noted below. Continue current treatment with the changes noted in the dictated 

addendum note





Assessment:


Vital Signs:





 Vital Signs








  Date Time  Temp Pulse Resp B/P (MAP) Pulse Ox O2 Delivery O2 Flow Rate FiO2


 


9/26/18 20:13      Room Air  


 


9/26/18 19:42     97   


 


9/26/18 18:31   18     


 


9/26/18 16:11 97.5   123/68 (86)    


 


9/26/18 08:22  65      








I&O











Intake and Output 


 


 9/26/18





 07:00


 


Intake Total 840 ml


 


Output Total 1 ml


 


Balance 839 ml


 


 


 


Intake Oral 840 ml


 


Output Urine Total 1 ml








Labs:





 Laboratory Tests








Test


 9/26/18


06:51 9/26/18


07:42 9/26/18


11:44 9/26/18


17:02


 


Prothrombin Time


 17.9 SEC


(9.4-11.4)  H 


 


 





 


Prothrombin Time INR


 1.8 (0.9-1.1)


H 


 


 





 


Glucose (Fingerstick)


 


 112 mg/dL


(70-99)  H 128 mg/dL


(70-99)  H 181 mg/dL


(70-99)  H


 


Test


 9/26/18


19:10 


 


 





 


Glucose (Fingerstick)


 102 mg/dL


(70-99)  H 


 


 














Current Medications:


Meds:





Current Medications


Cephalexin HCl (Keflex) 500 mg 1X  ONCE PO  Last administered on 9/21/18at 23:21

;  Start 9/21/18 at 23:15;  Stop 9/21/18 at 23:16;  Status DC


Insulin Human Lispro (HumaLOG) 0-5 UNITS TIDWMEALS SQ  Last administered on 9/25 /18at 11:54;  Start 9/22/18 at 08:00;  Stop 9/25/18 at 13:54;  Status DC


Dextrose 12.5 gm PRN Q15MIN  PRN IV SEE COMMENTS;  Start 9/21/18 at 23:00


Cephalexin HCl (Keflex) 500 mg TID PO  Last administered on 9/26/18at 19:13;  

Start 9/22/18 at 09:00;  Stop 9/29/18 at 08:59


Lorazepam (Ativan) 0.5 mg 1X  ONCE PO  Last administered on 9/21/18at 23:21;  

Start 9/21/18 at 23:15;  Stop 9/21/18 at 23:16;  Status DC


Famotidine (Pepcid) 20 mg 1X  ONCE PO  Last administered on 9/21/18at 23:21;  

Start 9/21/18 at 23:15;  Stop 9/21/18 at 23:16;  Status DC


Acetaminophen (Tylenol) 650 mg PRN Q6HRS  PRN PO PAIN / TEMP;  Start 9/22/18 at 

00:15


Multi-Ingredient Ointment (Analgesic Balm) 1 mami PRN QID  PRN TP MUSCLE PAIN;  

Start 9/22/18 at 00:15


Magnesium Hydroxide (Milk Of Magnesia) 2,400 mg PRN QHS  PRN PO CONSTIPATION;  

Start 9/22/18 at 00:15


Calcium Carbonate/ Glycine (Tums) 400 mg PRN TID  PRN PO INDIGESTION;  Start 9/ 22/18 at 00:15


Insulin Glargine (Lantus) 26 units HS SQ  Last administered on 9/25/18at 19:53;

  Start 9/22/18 at 21:00


Albuterol Sulfate (Ventolin) 2.5 mg PRN QID  PRN NEB SHORTNESS OF BREATH;  

Start 9/22/18 at 00:15


Lubiprostone (Amitiza) 24 mcg DAILY PO  Last administered on 9/25/18at 07:47;  

Start 9/22/18 at 09:00


Methenamine Hippurate (Hiprex) 1 gm BID PO  Last administered on 9/26/18at 19:12

;  Start 9/22/18 at 09:00


Potassium Chloride (Klor-Con) 20 meq DAILY PO  Last administered on 9/26/18at 08

:20;  Start 9/22/18 at 09:00


Simethicone (Gas-X) 80 mg PRN QID  PRN PO GAS / BLOATING;  Start 9/22/18 at 00:

15


Allopurinol (Zyloprim) 300 mg DAILY PO  Last administered on 9/26/18at 08:23;  

Start 9/22/18 at 09:00


Amantadine HCl (Symmetrel) 200 mg BID PO  Last administered on 9/26/18at 19:13;

  Start 9/22/18 at 09:00


Ascorbic Acid (Vitamin C) 500 mg BID PO  Last administered on 9/26/18at 19:14;  

Start 9/22/18 at 09:00


Carbidopa/Levodopa (Sinemet 25/250) 1 tab [id number] PO  Last administered on 9/ 26/18at 17:28;  Start 9/22/18 at 06:00


Artificial Tears (Artificial Tears) 1 drop PRN Q6HRS  PRN OU DRY EYE;  Start 9/ 22/18 at 00:15


Guaifenesin (Robitussin Dm) 5 ml PRN Q4HRS  PRN PO COUGH;  Start 9/22/18 at 00:

15


Acetaminophen/ Hydrocodone Bitart (Lortab 5/325) 1 tab PRN BID  PRN PO PAIN;  

Start 9/22/18 at 00:15


Acetaminophen/ Hydrocodone Bitart (Lortab 5/325) 1 tab QID PO  Last 

administered on 9/26/18at 19:13;  Start 9/22/18 at 09:00


Insulin Aspart (Novolog Mix 70-30) 5 units TIDWMEALS SQ  Last administered on 9/ 24/18at 17:00;  Start 9/22/18 at 08:00;  Stop 9/25/18 at 11:59;  Status DC


Cetirizine HCl (ZyrTEC) 10 mg DAILY PO  Last administered on 9/26/18at 08:23;  

Start 9/22/18 at 09:00


Al Hydroxide/Mg Hydroxide (Mylanta Plus Xs) 30 ml PRN Q6HRS  PRN PO DYSPEPSIA;  

Start 9/22/18 at 00:15


Meclizine HCl (Antivert) 25 mg PRN TID  PRN PO DIZZINESS;  Start 9/22/18 at 00:

15


Metoprolol Succinate (Toprol Xl) 100 mg DAILY PO  Last administered on 9/26/ 18at 08:22;  Start 9/22/18 at 09:00


Ondansetron HCl (Zofran Odt) 8 mg PRN TID  PRN PO NAUSEA/VOMITING;  Start 9/22/ 18 at 00:15


Polyethylene Glycol (miraLAX) 17 gm DAILY PO  Last administered on 9/26/18at 08:

21;  Start 9/22/18 at 09:00


Sennosides (Senna) 8.6 mg DAILY PO  Last administered on 9/26/18at 08:21;  

Start 9/22/18 at 09:00


Warfarin Sodium (Coumadin Per Pharmacy) 1 each PRN DAILY  PRN MC SEE COMMENTS 

Last administered on 9/26/18at 10:59;  Start 9/22/18 at 00:15


Warfarin Sodium (Coumadin) 1.5 mg DAILY PO ;  Start 9/22/18 at 09:00;  Status 

UNV


Simethicone (Gas-X) 80 mg PRN QID  PRN PO GAS / BLOATING;  Start 9/22/18 at 00:

15;  Status UNV


Non-Formulary Medication (Budesonide/ Formoterol Fumarate (Symbicort 160-4.5 

Mcg Inhaler)) 2 puff BID IH ;  Start 9/22/18 at 09:00;  Stop 9/22/18 at 09:00;  

Status DC


Losartan Potassium (Cozaar) 25 mg DAILY PO  Last administered on 9/26/18at 08:18

;  Start 9/22/18 at 09:00


Pantoprazole Sodium (Protonix) 40 mg DAILYAC PO  Last administered on 9/26/18at 

08:13;  Start 9/22/18 at 07:30


Non-Formulary Medication (Tiotropium Bromide (Spiriva Respimat)) 2.5 gm DAILY 

IH ;  Start 9/22/18 at 09:00;  Stop 9/22/18 at 09:00;  Status DC


Lorazepam (Ativan) 1 mg TID PO  Last administered on 9/26/18at 12:42;  Start 9/ 22/18 at 09:00;  Stop 9/26/18 at 18:03;  Status DC


Sertraline HCl (Zoloft) 50 mg DAILY PO  Last administered on 9/24/18at 08:04;  

Start 9/22/18 at 09:00;  Stop 9/24/18 at 09:00;  Status DC


Info (Anti-Coagulation Monitoring By Pharmacy) 1 each PRN DAILY  PRN MC SEE 

COMMENTS;  Start 9/22/18 at 09:00;  Status Cancel


Albuterol/ Ipratropium (Duoneb) 3 ml RTQID NEB  Last administered on 9/26/18at 

20:00;  Start 9/22/18 at 08:00


Budesonide (Pulmicort) 0.5 mg RTBID NEB  Last administered on 9/26/18at 20:00;  

Start 9/22/18 at 08:00


Warfarin Sodium (Coumadin) 1.5 mg 1X WARF  ONCE PO  Last administered on 9/22/ 18at 17:08;  Start 9/22/18 at 16:00;  Stop 9/22/18 at 16:01;  Status DC


Warfarin Sodium (Coumadin) 0.5 mg 1X WARF  ONCE PO ;  Start 9/22/18 at 16:00;  

Stop 9/22/18 at 16:00;  Status DC


Lactobacillus Rhamnosus (Culturelle) 1 cap BID PO  Last administered on 9/26/ 18at 19:13;  Start 9/22/18 at 21:00


Carbidopa/ Levodopa/ Entacapone (Stalevo 100) 1 tab PRN BID  PRN PO TREMORS;  

Start 9/22/18 at 17:00;  Stop 9/22/18 at 17:03;  Status DC


Carbidopa/Levodopa (Sinemet 25/100) 1 tab PRN BID  PRN PO TREMORS;  Start 9/22/ 18 at 17:15


Melatonin 3 mg QHS PO  Last administered on 9/26/18at 19:12;  Start 9/22/18 at 

21:00


Warfarin Sodium (Coumadin) 1.5 mg DAILY16 PO  Last administered on 9/25/18at 17:

32;  Start 9/23/18 at 16:00;  Stop 9/26/18 at 10:49;  Status DC


Sertraline HCl (Zoloft) 75 mg DAILY PO  Last administered on 9/26/18at 08:22;  

Start 9/24/18 at 09:00


Insulin Human Isoph/Insulin Regular (HumuLIN 70/30) 5 units TIDWMEALS SQ ;  

Start 9/25/18 at 12:00;  Stop 9/25/18 at 13:53;  Status DC


Insulin Human Lispro (HumaLOG) 5 units TIDWMEALS SQ  Last administered on 9/26/ 18at 17:27;  Start 9/25/18 at 17:00


Warfarin Sodium (Coumadin) 2 mg 1X WARF  ONCE PO  Last administered on 9/26/ 18at 17:29;  Start 9/26/18 at 16:00;  Stop 9/26/18 at 16:01;  Status DC


Lorazepam (Ativan) 1 mg HS PO  Last administered on 9/26/18at 19:38;  Start 9/26 /18 at 21:00


Lorazepam (Ativan) 0.5 mg DAILY PO ;  Start 9/27/18 at 09:00;  Stop 9/30/18 at 

08:50


Lorazepam (Ativan) 0.5 mg BID92 PO ;  Start 9/30/18 at 09:00





Active Scripts


Active


Reported


Omeprazole 20 Mg Capsule.dr 20 Mg PO DAILY


Claritin (Loratadine) 10 Mg Tablet 10 Mg PO DAILY


Ativan (Lorazepam) 1 Mg Tablet 1 Mg PO TID


Symbicort 160-4.5 Mcg Inhaler (Budesonide/Formoterol Fumarate) 10.2 Gm 

Hfa.aer.ad 2 Puff IH BID


Simethicone 80 Mg Tab.chew 80 Mg PO PRN QID PRN


Miralax (Polyethylene Glycol 3350) 17 Gm Powd.pack 17 Gm PO PRN DAILY PRN


Metoprolol Succinate ( Xl ) (Metoprolol Succinate) 100 Mg Tab.er.24h 100 Mg PO 

DAILY


Meclizine Hcl 25 Mg Tablet 25 Mg PO PRN TID PRN


Duoneb 0.5-3(2.5) Mg/3 Ml (Albuterol/Ipratropium) 3 Ml Ampul.neb 3 Ml NEB QID 

PRN


Artificial Tears Eye Drops (Dextran 70/Hypromellose) 15 Ml Drops 1 Drop EACHEYE 

QID PRN


Spiriva Respimat (Tiotropium Bromide) 4 Gm Mist.inhal 2.5 Gm IH DAILY


Vitamin C (Ascorbate Calcium) 500 Mg Tablet 500 Mg PO BID


Amantadine (Amantadine Hcl) 100 Mg Tablet 200 Mg PO BID


Tums (Calcium Carbonate) 200 Mg Tab.chew 400 Mg PO PRN TID PRN


Hiprex (Methenamine Hippurate) 1 Gm Tablet 1 Gm PO BID


Coumadin (Warfarin Sodium) 1 Mg Tablet 0.5 Mg PO DAILY


Coumadin (Warfarin Sodium) 1 Mg Tablet 1 Mg PO DAILY


Norco 5-325 Tablet (Hydrocodone Bit/Acetaminophen) 1 Each Tablet 1 Tab PO QID


Norco 5-325 Tablet (Hydrocodone Bit/Acetaminophen) 1 Each Tablet 1 Tab PO PRN 

BID PRN


Senna (Sennosides) 8.6 Mg Tablet 8.6 Mg PO DAILY


Dayana-Lanta Liquid (Mag Hydrox/Al Hydrox/Simeth) 355 Ml Oral.susp 30 Ml PO PRN 

Q6HRS PRN


Zofran (Ondansetron Hcl) 8 Mg Tablet 8 Mg PO PRN TID PRN


Zoloft (Sertraline Hcl) 50 Mg Tablet 50 Mg PO DAILY


Novolog (Insulin Aspart) 100 Unit/1 Ml Cartridge 5 Unit SQ TIDWMEALS


Robitussin Cough-Chest Dm Liq (Guaifenesin/Dextromethorphan) 237 Ml Liquid 5 Ml 

PO PRN Q4HRS PRN


Carbidopa-Levo  Mg Odt (Carbidopa/Levodopa) 1 Each Tab.rapdis 1 Tab PO QID


Simethicone 80 Mg Tab.chew 80 Mg PO PRN QID PRN


Lantus Solostar (Insulin Glargine,Hum.rec.anlog) 100 Unit/1 Ml Insuln.pen 26 

Unit SQ HS


Amitiza (Lubiprostone) 24 Mcg Capsule 24 Mcg PO DAILY


Klor-Con M20 (Potassium Chloride) 20 Meq Tab.er.prt 20 Meq PO DAILY


Benicar (Olmesartan Medoxomil) 5 Mg Tablet 5 Mg PO DAILY


Allopurinol 300 Mg Tablet 300 Mg PO DAILY


I have reviewed the current psychotropics carefully including drug 

interactions.  Risk benefit ratio favors no change other than as noted in my 

dictated progress note.





Diagnosis:


Problems:  


(1) Dementia


(2) UTI (urinary tract infection)


(3) MDD (major depressive disorder)


(4) Anxiety disorder


(5) Mild cognitive impairment











JULIANA CARDONA MD Sep 26, 2018 20:40

## 2018-09-26 NOTE — PN
DATE:  09/25/2018



This is a late entry for 09/25/2018 covers elements not covered in my initial

note.



SUBJECTIVE:  I met with the patient in the evening.  The patient slept 6 hours

previous evening.  She has not been agitated, aggressive, somewhat withdrawn,

depressed, but calmer.  She spent time in the day room, slept in the chair,

previous night in the day room.



REVIEW OF SYSTEMS:  Ambulation impaired, in wheelchair.  No CV, , pulmonary,

eye, ENT system symptoms on review.



MENTAL STATUS EXAM:  Reasonably oriented.  Speech coherent, low in volume. 

Abstraction fair.  Computation able to do two step serial 7's.  Mood and affect

remains somewhat dysphoric, anxious, but improved.  No suicidal or homicidal

ideation.  No threats towards others.



LABORATORY DATA:  Reviewed.



IMPRESSION:  Major depressive disorder, recurrent; anxiety disorder,

unspecified.



PLAN:  No change from initial note and we will attempt to taper the Ativan

gradually, maintain Zoloft 75 mg a day, Ativan at 1 mg 3 times a day, melatonin

3 mg at bedtime.





______________________________

MAN ALEXANDR CARDONA MD



DR:  RANCHO/angel  JOB#:  [id number] / [id number]

DD:  09/26/2018 16:48  DT:  09/26/2018 22:56

## 2018-09-27 VITALS — SYSTOLIC BLOOD PRESSURE: 153 MMHG | DIASTOLIC BLOOD PRESSURE: 78 MMHG

## 2018-09-27 VITALS — SYSTOLIC BLOOD PRESSURE: 145 MMHG | DIASTOLIC BLOOD PRESSURE: 77 MMHG

## 2018-09-27 RX ADMIN — OXYCODONE HYDROCHLORIDE AND ACETAMINOPHEN SCH MG: 500 TABLET ORAL at 20:21

## 2018-09-27 RX ADMIN — Medication SCH CAP: at 20:21

## 2018-09-27 RX ADMIN — Medication SCH CAP: at 08:44

## 2018-09-27 RX ADMIN — BUDESONIDE SCH MG: 0.5 SUSPENSION RESPIRATORY (INHALATION) at 10:58

## 2018-09-27 RX ADMIN — PANTOPRAZOLE SODIUM SCH MG: 40 TABLET, DELAYED RELEASE ORAL at 07:40

## 2018-09-27 RX ADMIN — CARBIDOPA AND LEVODOPA SCH TAB: 25; 250 TABLET ORAL at 00:22

## 2018-09-27 RX ADMIN — HYDROCODONE BITARTRATE AND ACETAMINOPHEN SCH TAB: 5; 325 TABLET ORAL at 12:12

## 2018-09-27 RX ADMIN — INSULIN LISPRO SCH UNITS: 100 INJECTION, SOLUTION INTRAVENOUS; SUBCUTANEOUS at 17:19

## 2018-09-27 RX ADMIN — WARFARIN SODIUM SCH MG: 2 TABLET ORAL at 16:27

## 2018-09-27 RX ADMIN — BUDESONIDE SCH MG: 0.5 SUSPENSION RESPIRATORY (INHALATION) at 20:58

## 2018-09-27 RX ADMIN — TRAZODONE HYDROCHLORIDE SCH MG: 50 TABLET, FILM COATED ORAL at 20:24

## 2018-09-27 RX ADMIN — SERTRALINE HYDROCHLORIDE SCH MG: 50 TABLET ORAL at 08:41

## 2018-09-27 RX ADMIN — LUBIPROSTONE SCH MCG: 24 CAPSULE, GELATIN COATED ORAL at 08:49

## 2018-09-27 RX ADMIN — INSULIN LISPRO SCH UNITS: 100 INJECTION, SOLUTION INTRAVENOUS; SUBCUTANEOUS at 12:24

## 2018-09-27 RX ADMIN — CEPHALEXIN SCH MG: 250 CAPSULE ORAL at 08:42

## 2018-09-27 RX ADMIN — AMANTADINE HYDROCHLORIDE SCH MG: 100 CAPSULE ORAL at 20:21

## 2018-09-27 RX ADMIN — IPRATROPIUM BROMIDE AND ALBUTEROL SULFATE SCH ML: .5; 3 SOLUTION RESPIRATORY (INHALATION) at 05:16

## 2018-09-27 RX ADMIN — POTASSIUM CHLORIDE SCH MEQ: 1500 TABLET, EXTENDED RELEASE ORAL at 08:42

## 2018-09-27 RX ADMIN — METHENAMINE HIPPURATE SCH GM: 1 TABLET ORAL at 08:49

## 2018-09-27 RX ADMIN — INSULIN GLARGINE SCH UNITS: 100 INJECTION, SOLUTION SUBCUTANEOUS at 20:25

## 2018-09-27 RX ADMIN — CEPHALEXIN SCH MG: 250 CAPSULE ORAL at 20:21

## 2018-09-27 RX ADMIN — Medication PRN EACH: at 14:57

## 2018-09-27 RX ADMIN — HYDROCODONE BITARTRATE AND ACETAMINOPHEN SCH TAB: 5; 325 TABLET ORAL at 20:23

## 2018-09-27 RX ADMIN — IPRATROPIUM BROMIDE AND ALBUTEROL SULFATE SCH ML: .5; 3 SOLUTION RESPIRATORY (INHALATION) at 20:58

## 2018-09-27 RX ADMIN — METOPROLOL SUCCINATE SCH MG: 50 TABLET, EXTENDED RELEASE ORAL at 08:43

## 2018-09-27 RX ADMIN — MELATONIN TAB 3 MG SCH MG: 3 TAB at 20:21

## 2018-09-27 RX ADMIN — AMANTADINE HYDROCHLORIDE SCH MG: 100 CAPSULE ORAL at 08:49

## 2018-09-27 RX ADMIN — LOSARTAN POTASSIUM SCH MG: 25 TABLET, FILM COATED ORAL at 08:44

## 2018-09-27 RX ADMIN — CETIRIZINE HYDROCHLORIDE SCH MG: 10 TABLET, FILM COATED ORAL at 08:44

## 2018-09-27 RX ADMIN — CARBIDOPA AND LEVODOPA SCH TAB: 25; 250 TABLET ORAL at 05:43

## 2018-09-27 RX ADMIN — CARBIDOPA AND LEVODOPA SCH TAB: 25; 250 TABLET ORAL at 12:12

## 2018-09-27 RX ADMIN — IPRATROPIUM BROMIDE AND ALBUTEROL SULFATE SCH ML: .5; 3 SOLUTION RESPIRATORY (INHALATION) at 10:58

## 2018-09-27 RX ADMIN — METHENAMINE HIPPURATE SCH GM: 1 TABLET ORAL at 20:21

## 2018-09-27 RX ADMIN — HYDROCODONE BITARTRATE AND ACETAMINOPHEN SCH TAB: 5; 325 TABLET ORAL at 16:31

## 2018-09-27 RX ADMIN — OXYCODONE HYDROCHLORIDE AND ACETAMINOPHEN SCH MG: 500 TABLET ORAL at 08:44

## 2018-09-27 RX ADMIN — CEPHALEXIN SCH MG: 250 CAPSULE ORAL at 12:18

## 2018-09-27 RX ADMIN — ALLOPURINOL SCH MG: 300 TABLET ORAL at 08:43

## 2018-09-27 RX ADMIN — IPRATROPIUM BROMIDE AND ALBUTEROL SULFATE SCH ML: .5; 3 SOLUTION RESPIRATORY (INHALATION) at 16:22

## 2018-09-27 RX ADMIN — INSULIN LISPRO SCH UNITS: 100 INJECTION, SOLUTION INTRAVENOUS; SUBCUTANEOUS at 07:42

## 2018-09-27 RX ADMIN — HYDROCODONE BITARTRATE AND ACETAMINOPHEN SCH TAB: 5; 325 TABLET ORAL at 08:48

## 2018-09-27 RX ADMIN — POLYETHYLENE GLYCOL 3350 SCH GM: 17 POWDER, FOR SOLUTION ORAL at 08:41

## 2018-09-27 RX ADMIN — CARBIDOPA AND LEVODOPA SCH TAB: 25; 250 TABLET ORAL at 16:27

## 2018-09-27 RX ADMIN — SENNOSIDES A AND B SCH MG: 8.6 TABLET, FILM COATED ORAL at 08:43

## 2018-09-27 RX ADMIN — HYDROCODONE BITARTRATE AND ACETAMINOPHEN PRN TAB: 5; 325 TABLET ORAL at 02:09

## 2018-09-27 NOTE — PDOC
Exam


Note:


[facility] Note:


Please also refer to the separate dictated note~for this date of service 

dictated separately.~Patient seen individually. Discussed the patient with 

Nursing staff reviewed the chart.~Reviewed interim history and current 

functioning. Reviewed vital signs,~Labs/ Radiology~and current medications 

noted below. Continue current treatment with the changes noted in the dictated 

addendum note





Assessment:


Vital Signs:





 Vital Signs








  Date Time  Temp Pulse Resp B/P (MAP) Pulse Ox O2 Delivery O2 Flow Rate FiO2


 


9/27/18 17:57      Room Air  


 


9/27/18 16:31     98   


 


9/27/18 15:46 97.6 61 16 145/77 (99)    








I&O











Intake and Output 


 


 9/27/18





 07:00


 


Intake Total 1200 ml


 


Balance 1200 ml


 


 


 


Intake Oral 1200 ml








Labs:





 Laboratory Tests








Test


 9/27/18


06:16 9/27/18


07:16 9/27/18


12:13 9/27/18


17:05


 


Prothrombin Time


 19.5 SEC


(9.4-11.4)  H 


 


 





 


Prothrombin Time INR


 2.0 (0.9-1.1)


H 


 


 





 


Glucose (Fingerstick)


 


 141 mg/dL


(70-99)  H 170 mg/dL


(70-99)  H 150 mg/dL


(70-99)  H


 


Test


 9/27/18


19:10 


 


 





 


Glucose (Fingerstick)


 154 mg/dL


(70-99)  H 


 


 














Current Medications:


Meds:





Current Medications


Cephalexin HCl (Keflex) 500 mg 1X  ONCE PO  Last administered on 9/21/18at 23:21

;  Start 9/21/18 at 23:15;  Stop 9/21/18 at 23:16;  Status DC


Insulin Human Lispro (HumaLOG) 0-5 UNITS TIDWMEALS SQ  Last administered on 9/25 /18at 11:54;  Start 9/22/18 at 08:00;  Stop 9/25/18 at 13:54;  Status DC


Dextrose 12.5 gm PRN Q15MIN  PRN IV SEE COMMENTS;  Start 9/21/18 at 23:00


Cephalexin HCl (Keflex) 500 mg TID PO  Last administered on 9/27/18at 12:18;  

Start 9/22/18 at 09:00;  Stop 9/29/18 at 08:59


Lorazepam (Ativan) 0.5 mg 1X  ONCE PO  Last administered on 9/21/18at 23:21;  

Start 9/21/18 at 23:15;  Stop 9/21/18 at 23:16;  Status DC


Famotidine (Pepcid) 20 mg 1X  ONCE PO  Last administered on 9/21/18at 23:21;  

Start 9/21/18 at 23:15;  Stop 9/21/18 at 23:16;  Status DC


Acetaminophen (Tylenol) 650 mg PRN Q6HRS  PRN PO PAIN / TEMP;  Start 9/22/18 at 

00:15


Multi-Ingredient Ointment (Analgesic Balm) 1 mami PRN QID  PRN TP MUSCLE PAIN;  

Start 9/22/18 at 00:15


Magnesium Hydroxide (Milk Of Magnesia) 2,400 mg PRN QHS  PRN PO CONSTIPATION;  

Start 9/22/18 at 00:15


Calcium Carbonate/ Glycine (Tums) 400 mg PRN TID  PRN PO INDIGESTION;  Start 9/ 22/18 at 00:15


Insulin Glargine (Lantus) 26 units HS SQ  Last administered on 9/25/18at 19:53;

  Start 9/22/18 at 21:00


Albuterol Sulfate (Ventolin) 2.5 mg PRN QID  PRN NEB SHORTNESS OF BREATH;  

Start 9/22/18 at 00:15


Lubiprostone (Amitiza) 24 mcg DAILY PO  Last administered on 9/27/18at 08:49;  

Start 9/22/18 at 09:00


Methenamine Hippurate (Hiprex) 1 gm BID PO  Last administered on 9/27/18at 08:49

;  Start 9/22/18 at 09:00


Potassium Chloride (Klor-Con) 20 meq DAILY PO  Last administered on 9/27/18at 08

:42;  Start 9/22/18 at 09:00


Simethicone (Gas-X) 80 mg PRN QID  PRN PO GAS / BLOATING;  Start 9/22/18 at 00:

15


Allopurinol (Zyloprim) 300 mg DAILY PO  Last administered on 9/27/18at 08:43;  

Start 9/22/18 at 09:00


Amantadine HCl (Symmetrel) 200 mg BID PO  Last administered on 9/27/18at 08:49;

  Start 9/22/18 at 09:00


Ascorbic Acid (Vitamin C) 500 mg BID PO  Last administered on 9/27/18at 08:44;  

Start 9/22/18 at 09:00


Carbidopa/Levodopa (Sinemet 25/250) 1 tab [id number] PO  Last administered on 9/ 27/18at 16:27;  Start 9/22/18 at 06:00


Artificial Tears (Artificial Tears) 1 drop PRN Q6HRS  PRN OU DRY EYE;  Start 9/ 22/18 at 00:15


Guaifenesin (Robitussin Dm) 5 ml PRN Q4HRS  PRN PO COUGH;  Start 9/22/18 at 00:

15


Acetaminophen/ Hydrocodone Bitart (Lortab 5/325) 1 tab PRN BID  PRN PO PAIN 

Last administered on 9/27/18at 02:09;  Start 9/22/18 at 00:15


Acetaminophen/ Hydrocodone Bitart (Lortab 5/325) 1 tab QID PO  Last 

administered on 9/27/18at 16:31;  Start 9/22/18 at 09:00


Insulin Aspart (Novolog Mix 70-30) 5 units TIDWMEALS SQ  Last administered on 9/ 24/18at 17:00;  Start 9/22/18 at 08:00;  Stop 9/25/18 at 11:59;  Status DC


Cetirizine HCl (ZyrTEC) 10 mg DAILY PO  Last administered on 9/27/18at 08:44;  

Start 9/22/18 at 09:00


Al Hydroxide/Mg Hydroxide (Mylanta Plus Xs) 30 ml PRN Q6HRS  PRN PO DYSPEPSIA;  

Start 9/22/18 at 00:15


Meclizine HCl (Antivert) 25 mg PRN TID  PRN PO DIZZINESS;  Start 9/22/18 at 00:

15


Metoprolol Succinate (Toprol Xl) 100 mg DAILY PO  Last administered on 9/27/ 18at 08:43;  Start 9/22/18 at 09:00


Ondansetron HCl (Zofran Odt) 8 mg PRN TID  PRN PO NAUSEA/VOMITING;  Start 9/22/ 18 at 00:15


Polyethylene Glycol (miraLAX) 17 gm DAILY PO  Last administered on 9/27/18at 08:

41;  Start 9/22/18 at 09:00


Sennosides (Senna) 8.6 mg DAILY PO  Last administered on 9/27/18at 08:43;  

Start 9/22/18 at 09:00


Warfarin Sodium (Coumadin Per Pharmacy) 1 each PRN DAILY  PRN MC SEE COMMENTS 

Last administered on 9/27/18at 14:57;  Start 9/22/18 at 00:15


Warfarin Sodium (Coumadin) 1.5 mg DAILY PO ;  Start 9/22/18 at 09:00;  Status 

UNV


Simethicone (Gas-X) 80 mg PRN QID  PRN PO GAS / BLOATING;  Start 9/22/18 at 00:

15;  Status UNV


Non-Formulary Medication (Budesonide/ Formoterol Fumarate (Symbicort 160-4.5 

Mcg Inhaler)) 2 puff BID IH ;  Start 9/22/18 at 09:00;  Stop 9/22/18 at 09:00;  

Status DC


Losartan Potassium (Cozaar) 25 mg DAILY PO  Last administered on 9/27/18at 08:44

;  Start 9/22/18 at 09:00


Pantoprazole Sodium (Protonix) 40 mg DAILYAC PO  Last administered on 9/27/18at 

07:40;  Start 9/22/18 at 07:30


Non-Formulary Medication (Tiotropium Bromide (Spiriva Respimat)) 2.5 gm DAILY 

IH ;  Start 9/22/18 at 09:00;  Stop 9/22/18 at 09:00;  Status DC


Lorazepam (Ativan) 1 mg TID PO  Last administered on 9/26/18at 12:42;  Start 9/ 22/18 at 09:00;  Stop 9/26/18 at 18:03;  Status DC


Sertraline HCl (Zoloft) 50 mg DAILY PO  Last administered on 9/24/18at 08:04;  

Start 9/22/18 at 09:00;  Stop 9/24/18 at 09:00;  Status DC


Info (Anti-Coagulation Monitoring By Pharmacy) 1 each PRN DAILY  PRN MC SEE 

COMMENTS;  Start 9/22/18 at 09:00;  Status Cancel


Albuterol/ Ipratropium (Duoneb) 3 ml RTQID NEB  Last administered on 9/27/18at 

16:22;  Start 9/22/18 at 08:00


Budesonide (Pulmicort) 0.5 mg RTBID NEB  Last administered on 9/27/18at 10:58;  

Start 9/22/18 at 08:00


Warfarin Sodium (Coumadin) 1.5 mg 1X WARF  ONCE PO  Last administered on 9/22/ 18at 17:08;  Start 9/22/18 at 16:00;  Stop 9/22/18 at 16:01;  Status DC


Warfarin Sodium (Coumadin) 0.5 mg 1X WARF  ONCE PO ;  Start 9/22/18 at 16:00;  

Stop 9/22/18 at 16:00;  Status DC


Lactobacillus Rhamnosus (Culturelle) 1 cap BID PO  Last administered on 9/27/ 18at 08:44;  Start 9/22/18 at 21:00


Carbidopa/ Levodopa/ Entacapone (Stalevo 100) 1 tab PRN BID  PRN PO TREMORS;  

Start 9/22/18 at 17:00;  Stop 9/22/18 at 17:03;  Status DC


Carbidopa/Levodopa (Sinemet 25/100) 1 tab PRN BID  PRN PO TREMORS;  Start 9/22/ 18 at 17:15


Melatonin 3 mg QHS PO  Last administered on 9/26/18at 19:12;  Start 9/22/18 at 

21:00


Warfarin Sodium (Coumadin) 1.5 mg DAILY16 PO  Last administered on 9/25/18at 17:

32;  Start 9/23/18 at 16:00;  Stop 9/26/18 at 10:49;  Status DC


Sertraline HCl (Zoloft) 75 mg DAILY PO  Last administered on 9/27/18at 08:41;  

Start 9/24/18 at 09:00


Insulin Human Isoph/Insulin Regular (HumuLIN 70/30) 5 units TIDWMEALS SQ ;  

Start 9/25/18 at 12:00;  Stop 9/25/18 at 13:53;  Status DC


Insulin Human Lispro (HumaLOG) 5 units TIDWMEALS SQ  Last administered on 9/27/ 18at 17:19;  Start 9/25/18 at 17:00


Warfarin Sodium (Coumadin) 2 mg 1X WARF  ONCE PO  Last administered on 9/26/ 18at 17:29;  Start 9/26/18 at 16:00;  Stop 9/26/18 at 16:01;  Status DC


Lorazepam (Ativan) 1 mg HS PO  Last administered on 9/26/18at 19:38;  Start 9/26 /18 at 21:00


Lorazepam (Ativan) 0.5 mg DAILY PO  Last administered on 9/27/18at 08:48;  

Start 9/27/18 at 09:00;  Stop 9/30/18 at 08:50


Lorazepam (Ativan) 0.5 mg BID92 PO ;  Start 9/30/18 at 09:00


Trazodone HCl (Desyrel) 50 mg QHS PO ;  Start 9/27/18 at 21:00


Trazodone HCl (Desyrel) 50 mg PRN QHS  PRN PO INSOMNIA;  Start 9/27/18 at 11:45


Warfarin Sodium (Coumadin) 2 mg DAILY16 PO  Last administered on 9/27/18at 16:27

;  Start 9/27/18 at 16:00





Active Scripts


Active


Reported


Omeprazole 20 Mg Capsule.dr 20 Mg PO DAILY


Claritin (Loratadine) 10 Mg Tablet 10 Mg PO DAILY


Ativan (Lorazepam) 1 Mg Tablet 1 Mg PO TID


Symbicort 160-4.5 Mcg Inhaler (Budesonide/Formoterol Fumarate) 10.2 Gm 

Hfa.aer.ad 2 Puff IH BID


Simethicone 80 Mg Tab.chew 80 Mg PO PRN QID PRN


Miralax (Polyethylene Glycol 3350) 17 Gm Powd.pack 17 Gm PO PRN DAILY PRN


Metoprolol Succinate ( Xl ) (Metoprolol Succinate) 100 Mg Tab.er.24h 100 Mg PO 

DAILY


Meclizine Hcl 25 Mg Tablet 25 Mg PO PRN TID PRN


Duoneb 0.5-3(2.5) Mg/3 Ml (Albuterol/Ipratropium) 3 Ml Ampul.neb 3 Ml NEB QID 

PRN


Artificial Tears Eye Drops (Dextran 70/Hypromellose) 15 Ml Drops 1 Drop EACHEYE 

QID PRN


Spiriva Respimat (Tiotropium Bromide) 4 Gm Mist.inhal 2.5 Gm IH DAILY


Vitamin C (Ascorbate Calcium) 500 Mg Tablet 500 Mg PO BID


Amantadine (Amantadine Hcl) 100 Mg Tablet 200 Mg PO BID


Tums (Calcium Carbonate) 200 Mg Tab.chew 400 Mg PO PRN TID PRN


Hiprex (Methenamine Hippurate) 1 Gm Tablet 1 Gm PO BID


Coumadin (Warfarin Sodium) 1 Mg Tablet 0.5 Mg PO DAILY


Coumadin (Warfarin Sodium) 1 Mg Tablet 1 Mg PO DAILY


Norco 5-325 Tablet (Hydrocodone Bit/Acetaminophen) 1 Each Tablet 1 Tab PO QID


Norco 5-325 Tablet (Hydrocodone Bit/Acetaminophen) 1 Each Tablet 1 Tab PO PRN 

BID PRN


Senna (Sennosides) 8.6 Mg Tablet 8.6 Mg PO DAILY


Dayana-Lanta Liquid (Mag Hydrox/Al Hydrox/Simeth) 355 Ml Oral.susp 30 Ml PO PRN 

Q6HRS PRN


Zofran (Ondansetron Hcl) 8 Mg Tablet 8 Mg PO PRN TID PRN


Zoloft (Sertraline Hcl) 50 Mg Tablet 50 Mg PO DAILY


Novolog (Insulin Aspart) 100 Unit/1 Ml Cartridge 5 Unit SQ TIDWMEALS


Robitussin Cough-Chest Dm Liq (Guaifenesin/Dextromethorphan) 237 Ml Liquid 5 Ml 

PO PRN Q4HRS PRN


Carbidopa-Levo  Mg Odt (Carbidopa/Levodopa) 1 Each Tab.rapdis 1 Tab PO QID


Simethicone 80 Mg Tab.chew 80 Mg PO PRN QID PRN


Lantus Solostar (Insulin Glargine,Hum.rec.anlog) 100 Unit/1 Ml Insuln.pen 26 

Unit SQ HS


Amitiza (Lubiprostone) 24 Mcg Capsule 24 Mcg PO DAILY


Klor-Con M20 (Potassium Chloride) 20 Meq Tab.er.prt 20 Meq PO DAILY


Benicar (Olmesartan Medoxomil) 5 Mg Tablet 5 Mg PO DAILY


Allopurinol 300 Mg Tablet 300 Mg PO DAILY


I have reviewed the current psychotropics carefully including drug 

interactions.  Risk benefit ratio favors no change other than as noted in my 

dictated progress note.





Diagnosis:


Problems:  


(1) Dementia


(2) UTI (urinary tract infection)


(3) MDD (major depressive disorder)


(4) Anxiety disorder


(5) Mild cognitive impairment











JULIANA CARDONA MD Sep 27, 2018 20:23

## 2018-09-28 VITALS — DIASTOLIC BLOOD PRESSURE: 67 MMHG | SYSTOLIC BLOOD PRESSURE: 146 MMHG

## 2018-09-28 VITALS — DIASTOLIC BLOOD PRESSURE: 56 MMHG | SYSTOLIC BLOOD PRESSURE: 123 MMHG

## 2018-09-28 VITALS — SYSTOLIC BLOOD PRESSURE: 137 MMHG | DIASTOLIC BLOOD PRESSURE: 81 MMHG

## 2018-09-28 RX ADMIN — WARFARIN SODIUM SCH MG: 2 TABLET ORAL at 15:52

## 2018-09-28 RX ADMIN — IPRATROPIUM BROMIDE AND ALBUTEROL SULFATE SCH ML: .5; 3 SOLUTION RESPIRATORY (INHALATION) at 16:34

## 2018-09-28 RX ADMIN — MELATONIN TAB 3 MG SCH MG: 3 TAB at 19:03

## 2018-09-28 RX ADMIN — CETIRIZINE HYDROCHLORIDE SCH MG: 10 TABLET, FILM COATED ORAL at 08:00

## 2018-09-28 RX ADMIN — Medication SCH CAP: at 19:03

## 2018-09-28 RX ADMIN — OXYCODONE HYDROCHLORIDE AND ACETAMINOPHEN SCH MG: 500 TABLET ORAL at 08:02

## 2018-09-28 RX ADMIN — PANTOPRAZOLE SODIUM SCH MG: 40 TABLET, DELAYED RELEASE ORAL at 07:57

## 2018-09-28 RX ADMIN — INSULIN LISPRO SCH UNITS: 100 INJECTION, SOLUTION INTRAVENOUS; SUBCUTANEOUS at 08:07

## 2018-09-28 RX ADMIN — POTASSIUM CHLORIDE SCH MEQ: 1500 TABLET, EXTENDED RELEASE ORAL at 08:02

## 2018-09-28 RX ADMIN — CARBIDOPA AND LEVODOPA PRN TAB: 25; 100 TABLET ORAL at 20:09

## 2018-09-28 RX ADMIN — HYDROCODONE BITARTRATE AND ACETAMINOPHEN SCH TAB: 5; 325 TABLET ORAL at 17:45

## 2018-09-28 RX ADMIN — IPRATROPIUM BROMIDE AND ALBUTEROL SULFATE SCH ML: .5; 3 SOLUTION RESPIRATORY (INHALATION) at 05:23

## 2018-09-28 RX ADMIN — INSULIN LISPRO SCH UNITS: 100 INJECTION, SOLUTION INTRAVENOUS; SUBCUTANEOUS at 12:52

## 2018-09-28 RX ADMIN — TRAZODONE HYDROCHLORIDE SCH MG: 50 TABLET, FILM COATED ORAL at 19:03

## 2018-09-28 RX ADMIN — CARBIDOPA AND LEVODOPA PRN TAB: 25; 100 TABLET ORAL at 15:50

## 2018-09-28 RX ADMIN — LUBIPROSTONE SCH MCG: 24 CAPSULE, GELATIN COATED ORAL at 08:01

## 2018-09-28 RX ADMIN — INSULIN LISPRO SCH UNITS: 100 INJECTION, SOLUTION INTRAVENOUS; SUBCUTANEOUS at 17:42

## 2018-09-28 RX ADMIN — OXYCODONE HYDROCHLORIDE AND ACETAMINOPHEN SCH MG: 500 TABLET ORAL at 19:04

## 2018-09-28 RX ADMIN — BUDESONIDE SCH MG: 0.5 SUSPENSION RESPIRATORY (INHALATION) at 20:05

## 2018-09-28 RX ADMIN — INSULIN GLARGINE SCH UNITS: 100 INJECTION, SOLUTION SUBCUTANEOUS at 19:40

## 2018-09-28 RX ADMIN — LOSARTAN POTASSIUM SCH MG: 25 TABLET, FILM COATED ORAL at 08:06

## 2018-09-28 RX ADMIN — BUDESONIDE SCH MG: 0.5 SUSPENSION RESPIRATORY (INHALATION) at 11:01

## 2018-09-28 RX ADMIN — Medication SCH CAP: at 08:01

## 2018-09-28 RX ADMIN — IPRATROPIUM BROMIDE AND ALBUTEROL SULFATE SCH ML: .5; 3 SOLUTION RESPIRATORY (INHALATION) at 20:05

## 2018-09-28 RX ADMIN — CARBIDOPA AND LEVODOPA SCH TAB: 25; 250 TABLET ORAL at 00:00

## 2018-09-28 RX ADMIN — ALLOPURINOL SCH MG: 300 TABLET ORAL at 08:01

## 2018-09-28 RX ADMIN — METOPROLOL SUCCINATE SCH MG: 50 TABLET, EXTENDED RELEASE ORAL at 08:05

## 2018-09-28 RX ADMIN — CARBIDOPA AND LEVODOPA SCH TAB: 25; 250 TABLET ORAL at 12:50

## 2018-09-28 RX ADMIN — HYDROCODONE BITARTRATE AND ACETAMINOPHEN SCH TAB: 5; 325 TABLET ORAL at 17:40

## 2018-09-28 RX ADMIN — AMANTADINE HYDROCHLORIDE SCH MG: 100 CAPSULE ORAL at 08:01

## 2018-09-28 RX ADMIN — HYDROCODONE BITARTRATE AND ACETAMINOPHEN SCH TAB: 5; 325 TABLET ORAL at 14:18

## 2018-09-28 RX ADMIN — AMANTADINE HYDROCHLORIDE SCH MG: 100 CAPSULE ORAL at 19:04

## 2018-09-28 RX ADMIN — CARBIDOPA AND LEVODOPA SCH TAB: 25; 250 TABLET ORAL at 05:38

## 2018-09-28 RX ADMIN — METHENAMINE HIPPURATE SCH GM: 1 TABLET ORAL at 19:06

## 2018-09-28 RX ADMIN — HYDROCODONE BITARTRATE AND ACETAMINOPHEN SCH TAB: 5; 325 TABLET ORAL at 19:07

## 2018-09-28 RX ADMIN — IPRATROPIUM BROMIDE AND ALBUTEROL SULFATE SCH ML: .5; 3 SOLUTION RESPIRATORY (INHALATION) at 11:01

## 2018-09-28 RX ADMIN — CEPHALEXIN SCH MG: 250 CAPSULE ORAL at 08:00

## 2018-09-28 RX ADMIN — HYDROCODONE BITARTRATE AND ACETAMINOPHEN PRN TAB: 5; 325 TABLET ORAL at 05:40

## 2018-09-28 RX ADMIN — HYDROCODONE BITARTRATE AND ACETAMINOPHEN SCH TAB: 5; 325 TABLET ORAL at 09:40

## 2018-09-28 RX ADMIN — METHENAMINE HIPPURATE SCH GM: 1 TABLET ORAL at 07:58

## 2018-09-28 RX ADMIN — CEPHALEXIN SCH MG: 250 CAPSULE ORAL at 19:03

## 2018-09-28 RX ADMIN — CARBIDOPA AND LEVODOPA SCH TAB: 25; 250 TABLET ORAL at 17:39

## 2018-09-28 RX ADMIN — SERTRALINE HYDROCHLORIDE SCH MG: 50 TABLET ORAL at 07:59

## 2018-09-28 RX ADMIN — CEPHALEXIN SCH MG: 250 CAPSULE ORAL at 12:50

## 2018-09-28 RX ADMIN — SENNOSIDES A AND B SCH MG: 8.6 TABLET, FILM COATED ORAL at 08:00

## 2018-09-28 RX ADMIN — POLYETHYLENE GLYCOL 3350 SCH GM: 17 POWDER, FOR SOLUTION ORAL at 08:02

## 2018-09-28 NOTE — PDOC
Exam


Note:


[facility] Note:


Please also refer to the separate dictated note~for this date of service 

dictated separately.~Patient seen individually. Discussed the patient with 

Nursing staff reviewed the chart.~Reviewed interim history and current 

functioning. Reviewed vital signs,~Labs/ Radiology~and current medications 

noted below. Continue current treatment with the changes noted in the dictated 

addendum note





Assessment:


Vital Signs:





 Vital Signs








  Date Time  Temp Pulse Resp B/P (MAP) Pulse Ox O2 Delivery O2 Flow Rate FiO2


 


9/28/18 20:05     97 Room Air  


 


9/28/18 19:07   20     


 


9/28/18 16:00 98.7 75  137/81 (99)    








I&O











Intake and Output 


 


 9/28/18





 07:00


 


Intake Total 960 ml


 


Balance 960 ml


 


 


 


Intake Oral 600 ml


 


Tube Feeding 360 ml








Labs:





 Laboratory Tests








Test


 9/28/18


07:10 9/28/18


11:16 9/28/18


16:16


 


Glucose (Fingerstick)


 108 mg/dL


(70-99)  H 82 mg/dL


(70-99) 106 mg/dL


(70-99)  H











Current Medications:


Meds:





Current Medications


Cephalexin HCl (Keflex) 500 mg 1X  ONCE PO  Last administered on 9/21/18at 23:21

;  Start 9/21/18 at 23:15;  Stop 9/21/18 at 23:16;  Status DC


Insulin Human Lispro (HumaLOG) 0-5 UNITS TIDWMEALS SQ  Last administered on 9/25 /18at 11:54;  Start 9/22/18 at 08:00;  Stop 9/25/18 at 13:54;  Status DC


Dextrose 12.5 gm PRN Q15MIN  PRN IV SEE COMMENTS;  Start 9/21/18 at 23:00


Cephalexin HCl (Keflex) 500 mg TID PO  Last administered on 9/28/18at 19:03;  

Start 9/22/18 at 09:00;  Stop 9/29/18 at 08:59


Lorazepam (Ativan) 0.5 mg 1X  ONCE PO  Last administered on 9/21/18at 23:21;  

Start 9/21/18 at 23:15;  Stop 9/21/18 at 23:16;  Status DC


Famotidine (Pepcid) 20 mg 1X  ONCE PO  Last administered on 9/21/18at 23:21;  

Start 9/21/18 at 23:15;  Stop 9/21/18 at 23:16;  Status DC


Acetaminophen (Tylenol) 650 mg PRN Q6HRS  PRN PO PAIN / TEMP;  Start 9/22/18 at 

00:15


Multi-Ingredient Ointment (Analgesic Balm) 1 mami PRN QID  PRN TP MUSCLE PAIN;  

Start 9/22/18 at 00:15


Magnesium Hydroxide (Milk Of Magnesia) 2,400 mg PRN QHS  PRN PO CONSTIPATION 

Last administered on 9/28/18at 19:26;  Start 9/22/18 at 00:15


Calcium Carbonate/ Glycine (Tums) 400 mg PRN TID  PRN PO INDIGESTION;  Start 9/ 22/18 at 00:15


Insulin Glargine (Lantus) 26 units HS SQ  Last administered on 9/28/18at 19:40;

  Start 9/22/18 at 21:00


Albuterol Sulfate (Ventolin) 2.5 mg PRN QID  PRN NEB SHORTNESS OF BREATH;  

Start 9/22/18 at 00:15


Lubiprostone (Amitiza) 24 mcg DAILY PO  Last administered on 9/28/18at 08:01;  

Start 9/22/18 at 09:00


Methenamine Hippurate (Hiprex) 1 gm BID PO  Last administered on 9/28/18at 19:06

;  Start 9/22/18 at 09:00


Potassium Chloride (Klor-Con) 20 meq DAILY PO  Last administered on 9/28/18at 08

:02;  Start 9/22/18 at 09:00


Simethicone (Gas-X) 80 mg PRN QID  PRN PO GAS / BLOATING;  Start 9/22/18 at 00:

15


Allopurinol (Zyloprim) 300 mg DAILY PO  Last administered on 9/28/18at 08:01;  

Start 9/22/18 at 09:00


Amantadine HCl (Symmetrel) 200 mg BID PO  Last administered on 9/28/18at 19:04;

  Start 9/22/18 at 09:00


Ascorbic Acid (Vitamin C) 500 mg BID PO  Last administered on 9/28/18at 19:04;  

Start 9/22/18 at 09:00


Carbidopa/Levodopa (Sinemet 25/250) 1 tab [id number] PO  Last administered on 9/

28/18at 17:39;  Start 9/22/18 at 06:00


Artificial Tears (Artificial Tears) 1 drop PRN Q6HRS  PRN OU DRY EYE;  Start 9/ 22/18 at 00:15


Guaifenesin (Robitussin Dm) 5 ml PRN Q4HRS  PRN PO COUGH;  Start 9/22/18 at 00:

15


Acetaminophen/ Hydrocodone Bitart (Lortab 5/325) 1 tab PRN BID  PRN PO PAIN 

Last administered on 9/28/18at 05:40;  Start 9/22/18 at 00:15


Acetaminophen/ Hydrocodone Bitart (Lortab 5/325) 1 tab QID PO  Last 

administered on 9/28/18at 19:07;  Start 9/22/18 at 09:00


Insulin Aspart (Novolog Mix 70-30) 5 units TIDWMEALS SQ  Last administered on 9/ 24/18at 17:00;  Start 9/22/18 at 08:00;  Stop 9/25/18 at 11:59;  Status DC


Cetirizine HCl (ZyrTEC) 10 mg DAILY PO  Last administered on 9/28/18at 08:00;  

Start 9/22/18 at 09:00


Al Hydroxide/Mg Hydroxide (Mylanta Plus Xs) 30 ml PRN Q6HRS  PRN PO DYSPEPSIA;  

Start 9/22/18 at 00:15


Meclizine HCl (Antivert) 25 mg PRN TID  PRN PO DIZZINESS;  Start 9/22/18 at 00:

15


Metoprolol Succinate (Toprol Xl) 100 mg DAILY PO  Last administered on 9/28/ 18at 08:05;  Start 9/22/18 at 09:00


Ondansetron HCl (Zofran Odt) 8 mg PRN TID  PRN PO NAUSEA/VOMITING;  Start 9/22/ 18 at 00:15


Polyethylene Glycol (miraLAX) 17 gm DAILY PO  Last administered on 9/28/18at 08:

02;  Start 9/22/18 at 09:00


Sennosides (Senna) 8.6 mg DAILY PO  Last administered on 9/28/18at 08:00;  

Start 9/22/18 at 09:00


Warfarin Sodium (Coumadin Per Pharmacy) 1 each PRN DAILY  PRN MC SEE COMMENTS 

Last administered on 9/27/18at 14:57;  Start 9/22/18 at 00:15


Warfarin Sodium (Coumadin) 1.5 mg DAILY PO ;  Start 9/22/18 at 09:00;  Status 

UNV


Simethicone (Gas-X) 80 mg PRN QID  PRN PO GAS / BLOATING;  Start 9/22/18 at 00:

15;  Status UNV


Non-Formulary Medication (Budesonide/ Formoterol Fumarate (Symbicort 160-4.5 

Mcg Inhaler)) 2 puff BID IH ;  Start 9/22/18 at 09:00;  Stop 9/22/18 at 09:00;  

Status DC


Losartan Potassium (Cozaar) 25 mg DAILY PO  Last administered on 9/28/18at 08:06

;  Start 9/22/18 at 09:00


Pantoprazole Sodium (Protonix) 40 mg DAILYAC PO  Last administered on 9/28/18at 

07:57;  Start 9/22/18 at 07:30


Non-Formulary Medication (Tiotropium Bromide (Spiriva Respimat)) 2.5 gm DAILY 

IH ;  Start 9/22/18 at 09:00;  Stop 9/22/18 at 09:00;  Status DC


Lorazepam (Ativan) 1 mg TID PO  Last administered on 9/26/18at 12:42;  Start 9/ 22/18 at 09:00;  Stop 9/26/18 at 18:03;  Status DC


Sertraline HCl (Zoloft) 50 mg DAILY PO  Last administered on 9/24/18at 08:04;  

Start 9/22/18 at 09:00;  Stop 9/24/18 at 09:00;  Status DC


Info (Anti-Coagulation Monitoring By Pharmacy) 1 each PRN DAILY  PRN MC SEE 

COMMENTS;  Start 9/22/18 at 09:00;  Status Cancel


Albuterol/ Ipratropium (Duoneb) 3 ml RTQID NEB  Last administered on 9/28/18at 

20:05;  Start 9/22/18 at 08:00


Budesonide (Pulmicort) 0.5 mg RTBID NEB  Last administered on 9/28/18at 20:05;  

Start 9/22/18 at 08:00


Warfarin Sodium (Coumadin) 1.5 mg 1X WARF  ONCE PO  Last administered on 9/22/ 18at 17:08;  Start 9/22/18 at 16:00;  Stop 9/22/18 at 16:01;  Status DC


Warfarin Sodium (Coumadin) 0.5 mg 1X WARF  ONCE PO ;  Start 9/22/18 at 16:00;  

Stop 9/22/18 at 16:00;  Status DC


Lactobacillus Rhamnosus (Culturelle) 1 cap BID PO  Last administered on 9/28/ 18at 19:03;  Start 9/22/18 at 21:00


Carbidopa/ Levodopa/ Entacapone (Stalevo 100) 1 tab PRN BID  PRN PO TREMORS;  

Start 9/22/18 at 17:00;  Stop 9/22/18 at 17:03;  Status DC


Carbidopa/Levodopa (Sinemet 25/100) 1 tab PRN BID  PRN PO TREMORS Last 

administered on 9/28/18at 20:09;  Start 9/22/18 at 17:15


Melatonin 3 mg QHS PO  Last administered on 9/28/18at 19:03;  Start 9/22/18 at 

21:00


Warfarin Sodium (Coumadin) 1.5 mg DAILY16 PO  Last administered on 9/25/18at 17:

32;  Start 9/23/18 at 16:00;  Stop 9/26/18 at 10:49;  Status DC


Sertraline HCl (Zoloft) 75 mg DAILY PO  Last administered on 9/28/18at 07:59;  

Start 9/24/18 at 09:00


Insulin Human Isoph/Insulin Regular (HumuLIN 70/30) 5 units TIDWMEALS SQ ;  

Start 9/25/18 at 12:00;  Stop 9/25/18 at 13:53;  Status DC


Insulin Human Lispro (HumaLOG) 5 units TIDWMEALS SQ  Last administered on 9/28/ 18at 17:42;  Start 9/25/18 at 17:00


Warfarin Sodium (Coumadin) 2 mg 1X WARF  ONCE PO  Last administered on 9/26/ 18at 17:29;  Start 9/26/18 at 16:00;  Stop 9/26/18 at 16:01;  Status DC


Lorazepam (Ativan) 1 mg HS PO  Last administered on 9/28/18at 19:06;  Start 9/26 /18 at 21:00


Lorazepam (Ativan) 0.5 mg DAILY PO  Last administered on 9/27/18at 20:23;  

Start 9/27/18 at 09:00;  Stop 9/30/18 at 08:50


Lorazepam (Ativan) 0.5 mg BID92 PO ;  Start 9/30/18 at 09:00


Trazodone HCl (Desyrel) 50 mg QHS PO  Last administered on 9/28/18at 19:03;  

Start 9/27/18 at 21:00


Trazodone HCl (Desyrel) 50 mg PRN QHS  PRN PO INSOMNIA;  Start 9/27/18 at 11:45


Warfarin Sodium (Coumadin) 2 mg DAILY16 PO  Last administered on 9/28/18at 15:52

;  Start 9/27/18 at 16:00





Active Scripts


Active


Reported


Omeprazole 20 Mg Capsule.dr 20 Mg PO DAILY


Claritin (Loratadine) 10 Mg Tablet 10 Mg PO DAILY


Ativan (Lorazepam) 1 Mg Tablet 1 Mg PO TID


Symbicort 160-4.5 Mcg Inhaler (Budesonide/Formoterol Fumarate) 10.2 Gm 

Hfa.aer.ad 2 Puff IH BID


Simethicone 80 Mg Tab.chew 80 Mg PO PRN QID PRN


Miralax (Polyethylene Glycol 3350) 17 Gm Powd.pack 17 Gm PO PRN DAILY PRN


Metoprolol Succinate ( Xl ) (Metoprolol Succinate) 100 Mg Tab.er.24h 100 Mg PO 

DAILY


Meclizine Hcl 25 Mg Tablet 25 Mg PO PRN TID PRN


Duoneb 0.5-3(2.5) Mg/3 Ml (Albuterol/Ipratropium) 3 Ml Ampul.neb 3 Ml NEB QID 

PRN


Artificial Tears Eye Drops (Dextran 70/Hypromellose) 15 Ml Drops 1 Drop EACHEYE 

QID PRN


Spiriva Respimat (Tiotropium Bromide) 4 Gm Mist.inhal 2.5 Gm IH DAILY


Vitamin C (Ascorbate Calcium) 500 Mg Tablet 500 Mg PO BID


Amantadine (Amantadine Hcl) 100 Mg Tablet 200 Mg PO BID


Tums (Calcium Carbonate) 200 Mg Tab.chew 400 Mg PO PRN TID PRN


Hiprex (Methenamine Hippurate) 1 Gm Tablet 1 Gm PO BID


Coumadin (Warfarin Sodium) 1 Mg Tablet 0.5 Mg PO DAILY


Coumadin (Warfarin Sodium) 1 Mg Tablet 1 Mg PO DAILY


Norco 5-325 Tablet (Hydrocodone Bit/Acetaminophen) 1 Each Tablet 1 Tab PO QID


Norco 5-325 Tablet (Hydrocodone Bit/Acetaminophen) 1 Each Tablet 1 Tab PO PRN 

BID PRN


Senna (Sennosides) 8.6 Mg Tablet 8.6 Mg PO DAILY


Dayana-Lanta Liquid (Mag Hydrox/Al Hydrox/Simeth) 355 Ml Oral.susp 30 Ml PO PRN 

Q6HRS PRN


Zofran (Ondansetron Hcl) 8 Mg Tablet 8 Mg PO PRN TID PRN


Zoloft (Sertraline Hcl) 50 Mg Tablet 50 Mg PO DAILY


Novolog (Insulin Aspart) 100 Unit/1 Ml Cartridge 5 Unit SQ TIDWMEALS


Robitussin Cough-Chest Dm Liq (Guaifenesin/Dextromethorphan) 237 Ml Liquid 5 Ml 

PO PRN Q4HRS PRN


Carbidopa-Levo  Mg Odt (Carbidopa/Levodopa) 1 Each Tab.rapdis 1 Tab PO QID


Simethicone 80 Mg Tab.chew 80 Mg PO PRN QID PRN


Lantus Solostar (Insulin Glargine,Hum.rec.anlog) 100 Unit/1 Ml Insuln.pen 26 

Unit SQ HS


Amitiza (Lubiprostone) 24 Mcg Capsule 24 Mcg PO DAILY


Klor-Con M20 (Potassium Chloride) 20 Meq Tab.er.prt 20 Meq PO DAILY


Benicar (Olmesartan Medoxomil) 5 Mg Tablet 5 Mg PO DAILY


Allopurinol 300 Mg Tablet 300 Mg PO DAILY


I have reviewed the current psychotropics carefully including drug 

interactions.  Risk benefit ratio favors no change other than as noted in my 

dictated progress note.





Diagnosis:


Problems:  


(1) Dementia


(2) UTI (urinary tract infection)


(3) MDD (major depressive disorder)


(4) Anxiety disorder


(5) Mild cognitive impairment











JULIANA CARDONA MD Sep 28, 2018 20:42

## 2018-09-28 NOTE — PN
DATE:  09/27/2018



This is a late entry for 09/27/2018 covers elements not covered in my initial

note.



SUBJECTIVE:  I met with the patient in the evening.  The patient was staffed at

a treatment team meeting with the entire team in the morning.  She slept 4 hours

previous night.  Reviewed her history.  She used to be a nursing aide and LPN

for a long time.  She remains somewhat suspicious, but less so than before.  She

has talked about wanting a job and wanting to go back living at home, both of

which are not viable options for her.



REVIEW OF SYSTEMS:  Ambulation impaired, in wheelchair.  No CV, , pulmonary,

eye, ENT system symptoms on review.  She is quite apprehensive having a

breakthrough Parkinson symptoms in the evening when I met with her.  We will

defer to Dr. Montoya, Neurology, for followup.  No CV, , pulmonary, eye, ENT

system symptoms on review.



MENTAL STATUS EXAM:  Oriented to herself and situation.  Speech is coherent, has

some latency.  Abstraction fair, computation impaired, language function intact.

 Mood and affect depressed, withdrawn.



LABORATORY DATA:  Reviewed.



IMPRESSION:  Major depressive disorder, recurrent; anxiety disorder,

unspecified.  Rest as above.



PLAN:  Add trazodone 50 mg at bedtime scheduled, may repeat x 1 for insomnia

since she slept 4 hours previous evening.  Continue rest unchanged from initial

note.





______________________________

MAN ALEXANDR CARDONA MD



DR:  RANCHO/angel  JOB#:  [id number] / [id number]

DD:  09/28/2018 12:54  DT:  09/28/2018 23:33

## 2018-09-28 NOTE — PN
DATE:  09/26/2018



This is a late entry for 09/26/2018 covers elements not covered in my initial

note.



SUBJECTIVE:  I met with the patient in the evening.  The patient slept 7 hours

previous evening.  She has not had any behaviors per nursing report, compliant

with medications, somewhat withdrawn.  Denies suicidal ideation.



REVIEW OF SYSTEMS:  Ambulation impaired, in wheelchair, tremors consequent to

Parkinson's.  No CV, , pulmonary, eye, ENT system symptoms on review.



MENTAL STATUS EXAM:  Reasonably oriented.  Speech is coherent, has some latency.

 Abstraction fair, computation impaired, language function intact.  Attention

span short.  No active suicidal ideation.



LABORATORY DATA:  Reviewed.



IMPRESSION:  Major depressive disorder, recurrent; anxiety disorder,

unspecified; Parkinson's disease.  Rest unchanged from admission note.



PLAN:  The patient is on Ativan 1 mg t.i.d.  We will reduce to 0.5 mg in the

morning, 1 mg twice a day for 3 days and 0.5 mg twice a day, 1 mg at bedtime

thereafter.  Maintain melatonin along with Zoloft 75 mg a day.





______________________________

MAN ALEXANDR CARDONA MD



DR:  RANCHO/angel  JOB#:  [id number] / [id number]

DD:  09/28/2018 12:05  DT:  09/28/2018 21:11

## 2018-09-29 VITALS — SYSTOLIC BLOOD PRESSURE: 139 MMHG | DIASTOLIC BLOOD PRESSURE: 79 MMHG

## 2018-09-29 VITALS — SYSTOLIC BLOOD PRESSURE: 146 MMHG | DIASTOLIC BLOOD PRESSURE: 69 MMHG

## 2018-09-29 RX ADMIN — HYDROCODONE BITARTRATE AND ACETAMINOPHEN SCH TAB: 5; 325 TABLET ORAL at 07:49

## 2018-09-29 RX ADMIN — HYDROCODONE BITARTRATE AND ACETAMINOPHEN SCH TAB: 5; 325 TABLET ORAL at 17:18

## 2018-09-29 RX ADMIN — BUDESONIDE SCH MG: 0.5 SUSPENSION RESPIRATORY (INHALATION) at 09:37

## 2018-09-29 RX ADMIN — Medication PRN EACH: at 09:26

## 2018-09-29 RX ADMIN — AMANTADINE HYDROCHLORIDE SCH MG: 100 CAPSULE ORAL at 07:50

## 2018-09-29 RX ADMIN — CETIRIZINE HYDROCHLORIDE SCH MG: 10 TABLET, FILM COATED ORAL at 07:53

## 2018-09-29 RX ADMIN — Medication SCH CAP: at 07:46

## 2018-09-29 RX ADMIN — ALLOPURINOL SCH MG: 300 TABLET ORAL at 07:53

## 2018-09-29 RX ADMIN — INSULIN LISPRO SCH UNITS: 100 INJECTION, SOLUTION INTRAVENOUS; SUBCUTANEOUS at 07:56

## 2018-09-29 RX ADMIN — POTASSIUM CHLORIDE SCH MEQ: 1500 TABLET, EXTENDED RELEASE ORAL at 07:47

## 2018-09-29 RX ADMIN — CARBIDOPA AND LEVODOPA SCH TAB: 25; 250 TABLET ORAL at 00:00

## 2018-09-29 RX ADMIN — HYDROCODONE BITARTRATE AND ACETAMINOPHEN SCH TAB: 5; 325 TABLET ORAL at 12:12

## 2018-09-29 RX ADMIN — IPRATROPIUM BROMIDE AND ALBUTEROL SULFATE SCH ML: .5; 3 SOLUTION RESPIRATORY (INHALATION) at 05:01

## 2018-09-29 RX ADMIN — CARBIDOPA AND LEVODOPA PRN TAB: 25; 100 TABLET ORAL at 15:45

## 2018-09-29 RX ADMIN — CARBIDOPA AND LEVODOPA SCH TAB: 25; 250 TABLET ORAL at 12:12

## 2018-09-29 RX ADMIN — SENNOSIDES A AND B SCH MG: 8.6 TABLET, FILM COATED ORAL at 07:49

## 2018-09-29 RX ADMIN — POLYETHYLENE GLYCOL 3350 SCH GM: 17 POWDER, FOR SOLUTION ORAL at 07:49

## 2018-09-29 RX ADMIN — METHENAMINE HIPPURATE SCH GM: 1 TABLET ORAL at 19:29

## 2018-09-29 RX ADMIN — INSULIN GLARGINE SCH UNITS: 100 INJECTION, SOLUTION SUBCUTANEOUS at 20:03

## 2018-09-29 RX ADMIN — INSULIN LISPRO SCH UNITS: 100 INJECTION, SOLUTION INTRAVENOUS; SUBCUTANEOUS at 12:21

## 2018-09-29 RX ADMIN — HYDROCODONE BITARTRATE AND ACETAMINOPHEN SCH TAB: 5; 325 TABLET ORAL at 19:28

## 2018-09-29 RX ADMIN — LUBIPROSTONE SCH MCG: 24 CAPSULE, GELATIN COATED ORAL at 07:41

## 2018-09-29 RX ADMIN — IPRATROPIUM BROMIDE AND ALBUTEROL SULFATE SCH ML: .5; 3 SOLUTION RESPIRATORY (INHALATION) at 09:37

## 2018-09-29 RX ADMIN — OXYCODONE HYDROCHLORIDE AND ACETAMINOPHEN SCH MG: 500 TABLET ORAL at 19:25

## 2018-09-29 RX ADMIN — Medication SCH CAP: at 19:25

## 2018-09-29 RX ADMIN — CARBIDOPA AND LEVODOPA SCH TAB: 25; 250 TABLET ORAL at 17:32

## 2018-09-29 RX ADMIN — AMANTADINE HYDROCHLORIDE SCH MG: 100 CAPSULE ORAL at 19:25

## 2018-09-29 RX ADMIN — CARBIDOPA AND LEVODOPA SCH TAB: 25; 250 TABLET ORAL at 07:54

## 2018-09-29 RX ADMIN — WARFARIN SODIUM SCH MG: 2 TABLET ORAL at 17:18

## 2018-09-29 RX ADMIN — METOPROLOL SUCCINATE SCH MG: 50 TABLET, EXTENDED RELEASE ORAL at 07:51

## 2018-09-29 RX ADMIN — BUDESONIDE SCH MG: 0.5 SUSPENSION RESPIRATORY (INHALATION) at 08:00

## 2018-09-29 RX ADMIN — LOSARTAN POTASSIUM SCH MG: 25 TABLET, FILM COATED ORAL at 07:46

## 2018-09-29 RX ADMIN — MELATONIN TAB 3 MG SCH MG: 3 TAB at 19:28

## 2018-09-29 RX ADMIN — TRAZODONE HYDROCHLORIDE SCH MG: 50 TABLET, FILM COATED ORAL at 19:29

## 2018-09-29 RX ADMIN — IPRATROPIUM BROMIDE AND ALBUTEROL SULFATE SCH ML: .5; 3 SOLUTION RESPIRATORY (INHALATION) at 20:47

## 2018-09-29 RX ADMIN — PANTOPRAZOLE SODIUM SCH MG: 40 TABLET, DELAYED RELEASE ORAL at 07:39

## 2018-09-29 RX ADMIN — IPRATROPIUM BROMIDE AND ALBUTEROL SULFATE SCH ML: .5; 3 SOLUTION RESPIRATORY (INHALATION) at 15:36

## 2018-09-29 RX ADMIN — SERTRALINE HYDROCHLORIDE SCH MG: 50 TABLET ORAL at 07:53

## 2018-09-29 RX ADMIN — METHENAMINE HIPPURATE SCH GM: 1 TABLET ORAL at 07:46

## 2018-09-29 RX ADMIN — INSULIN LISPRO SCH UNITS: 100 INJECTION, SOLUTION INTRAVENOUS; SUBCUTANEOUS at 17:34

## 2018-09-29 RX ADMIN — OXYCODONE HYDROCHLORIDE AND ACETAMINOPHEN SCH MG: 500 TABLET ORAL at 07:52

## 2018-09-29 NOTE — PDOC
Exam


Note:


[facility] Note:


Please also refer to the separate dictated note~for this date of service 

dictated separately.~Patient seen individually. Discussed the patient with 

Nursing staff reviewed the chart.~Reviewed interim history and current 

functioning. Reviewed vital signs,~Labs/ Radiology~and current medications 

noted below. Continue current treatment with the changes noted in the dictated 

addendum note





Assessment:


Vital Signs:





 Vital Signs








  Date Time  Temp Pulse Resp B/P (MAP) Pulse Ox O2 Delivery O2 Flow Rate FiO2


 


9/29/18 21:26     93   


 


9/29/18 20:50      Room Air  


 


9/29/18 17:18   16     


 


9/29/18 15:40 97.4 69  146/69 (94)    








I&O











Intake and Output 


 


 9/29/18





 07:00


 


Intake Total 960 ml


 


Balance 960 ml


 


 


 


Intake Oral 840 ml


 


Tube Feeding 120 ml


 


# Bowel Movements 1








Labs:





 Laboratory Tests








Test


 9/29/18


06:20 9/29/18


07:07 9/29/18


11:36 9/29/18


16:47


 


Prothrombin Time


 20.6 SEC


(9.4-11.4)  H 


 


 





 


Prothrombin Time INR


 2.1 (0.9-1.1)


H 


 


 





 


Glucose (Fingerstick)


 


 136 mg/dL


(70-99)  H 146 mg/dL


(70-99)  H 106 mg/dL


(70-99)  H


 


Test


 9/29/18


19:43 


 


 





 


Glucose (Fingerstick)


 94 mg/dL


(70-99) 


 


 














Current Medications:


Meds:





Current Medications


Cephalexin HCl (Keflex) 500 mg 1X  ONCE PO  Last administered on 9/21/18at 23:21

;  Start 9/21/18 at 23:15;  Stop 9/21/18 at 23:16;  Status DC


Insulin Human Lispro (HumaLOG) 0-5 UNITS TIDWMEALS SQ  Last administered on 9/25 /18at 11:54;  Start 9/22/18 at 08:00;  Stop 9/25/18 at 13:54;  Status DC


Dextrose 12.5 gm PRN Q15MIN  PRN IV SEE COMMENTS;  Start 9/21/18 at 23:00


Cephalexin HCl (Keflex) 500 mg TID PO  Last administered on 9/28/18at 19:03;  

Start 9/22/18 at 09:00;  Stop 9/29/18 at 08:59;  Status DC


Lorazepam (Ativan) 0.5 mg 1X  ONCE PO  Last administered on 9/21/18at 23:21;  

Start 9/21/18 at 23:15;  Stop 9/21/18 at 23:16;  Status DC


Famotidine (Pepcid) 20 mg 1X  ONCE PO  Last administered on 9/21/18at 23:21;  

Start 9/21/18 at 23:15;  Stop 9/21/18 at 23:16;  Status DC


Acetaminophen (Tylenol) 650 mg PRN Q6HRS  PRN PO PAIN / TEMP;  Start 9/22/18 at 

00:15


Multi-Ingredient Ointment (Analgesic Balm) 1 mami PRN QID  PRN TP MUSCLE PAIN;  

Start 9/22/18 at 00:15


Magnesium Hydroxide (Milk Of Magnesia) 2,400 mg PRN QHS  PRN PO CONSTIPATION 

Last administered on 9/28/18at 19:26;  Start 9/22/18 at 00:15


Calcium Carbonate/ Glycine (Tums) 400 mg PRN TID  PRN PO INDIGESTION;  Start 9/ 22/18 at 00:15


Insulin Glargine (Lantus) 26 units HS SQ  Last administered on 9/28/18at 19:40;

  Start 9/22/18 at 21:00


Albuterol Sulfate (Ventolin) 2.5 mg PRN QID  PRN NEB SHORTNESS OF BREATH;  

Start 9/22/18 at 00:15


Lubiprostone (Amitiza) 24 mcg DAILY PO  Last administered on 9/29/18at 07:41;  

Start 9/22/18 at 09:00


Methenamine Hippurate (Hiprex) 1 gm BID PO  Last administered on 9/29/18at 19:29

;  Start 9/22/18 at 09:00


Potassium Chloride (Klor-Con) 20 meq DAILY PO  Last administered on 9/29/18at 07

:47;  Start 9/22/18 at 09:00


Simethicone (Gas-X) 80 mg PRN QID  PRN PO GAS / BLOATING;  Start 9/22/18 at 00:

15


Allopurinol (Zyloprim) 300 mg DAILY PO  Last administered on 9/29/18at 07:53;  

Start 9/22/18 at 09:00


Amantadine HCl (Symmetrel) 200 mg BID PO  Last administered on 9/29/18at 19:25;

  Start 9/22/18 at 09:00


Ascorbic Acid (Vitamin C) 500 mg BID PO  Last administered on 9/29/18at 19:25;  

Start 9/22/18 at 09:00


Carbidopa/Levodopa (Sinemet 25/250) 1 tab [id number] PO  Last administered on 9/ 29/18at 17:32;  Start 9/22/18 at 06:00


Artificial Tears (Artificial Tears) 1 drop PRN Q6HRS  PRN OU DRY EYE;  Start 9/ 22/18 at 00:15


Guaifenesin (Robitussin Dm) 5 ml PRN Q4HRS  PRN PO COUGH;  Start 9/22/18 at 00:

15


Acetaminophen/ Hydrocodone Bitart (Lortab 5/325) 1 tab PRN BID  PRN PO PAIN 

Last administered on 9/28/18at 05:40;  Start 9/22/18 at 00:15


Acetaminophen/ Hydrocodone Bitart (Lortab 5/325) 1 tab QID PO  Last 

administered on 9/29/18at 19:28;  Start 9/22/18 at 09:00


Insulin Aspart (Novolog Mix 70-30) 5 units TIDWMEALS SQ  Last administered on 9/ 24/18at 17:00;  Start 9/22/18 at 08:00;  Stop 9/25/18 at 11:59;  Status DC


Cetirizine HCl (ZyrTEC) 10 mg DAILY PO  Last administered on 9/29/18at 07:53;  

Start 9/22/18 at 09:00


Al Hydroxide/Mg Hydroxide (Mylanta Plus Xs) 30 ml PRN Q6HRS  PRN PO DYSPEPSIA;  

Start 9/22/18 at 00:15


Meclizine HCl (Antivert) 25 mg PRN TID  PRN PO DIZZINESS;  Start 9/22/18 at 00:

15


Metoprolol Succinate (Toprol Xl) 100 mg DAILY PO  Last administered on 9/29/ 18at 07:51;  Start 9/22/18 at 09:00


Ondansetron HCl (Zofran Odt) 8 mg PRN TID  PRN PO NAUSEA/VOMITING;  Start 9/22/ 18 at 00:15


Polyethylene Glycol (miraLAX) 17 gm DAILY PO  Last administered on 9/29/18at 07:

49;  Start 9/22/18 at 09:00


Sennosides (Senna) 8.6 mg DAILY PO  Last administered on 9/29/18at 07:49;  

Start 9/22/18 at 09:00


Warfarin Sodium (Coumadin Per Pharmacy) 1 each PRN DAILY  PRN MC SEE COMMENTS 

Last administered on 9/29/18at 09:26;  Start 9/22/18 at 00:15


Warfarin Sodium (Coumadin) 1.5 mg DAILY PO ;  Start 9/22/18 at 09:00;  Status 

UNV


Simethicone (Gas-X) 80 mg PRN QID  PRN PO GAS / BLOATING;  Start 9/22/18 at 00:

15;  Status UNV


Non-Formulary Medication (Budesonide/ Formoterol Fumarate (Symbicort 160-4.5 

Mcg Inhaler)) 2 puff BID IH ;  Start 9/22/18 at 09:00;  Stop 9/22/18 at 09:00;  

Status DC


Losartan Potassium (Cozaar) 25 mg DAILY PO  Last administered on 9/29/18at 07:46

;  Start 9/22/18 at 09:00


Pantoprazole Sodium (Protonix) 40 mg DAILYAC PO  Last administered on 9/29/18at 

07:39;  Start 9/22/18 at 07:30


Non-Formulary Medication (Tiotropium Bromide (Spiriva Respimat)) 2.5 gm DAILY 

IH ;  Start 9/22/18 at 09:00;  Stop 9/22/18 at 09:00;  Status DC


Lorazepam (Ativan) 1 mg TID PO  Last administered on 9/26/18at 12:42;  Start 9/ 22/18 at 09:00;  Stop 9/26/18 at 18:03;  Status DC


Sertraline HCl (Zoloft) 50 mg DAILY PO  Last administered on 9/24/18at 08:04;  

Start 9/22/18 at 09:00;  Stop 9/24/18 at 09:00;  Status DC


Info (Anti-Coagulation Monitoring By Pharmacy) 1 each PRN DAILY  PRN MC SEE 

COMMENTS;  Start 9/22/18 at 09:00;  Status Cancel


Albuterol/ Ipratropium (Duoneb) 3 ml RTQID NEB  Last administered on 9/29/18at 

20:47;  Start 9/22/18 at 08:00


Budesonide (Pulmicort) 0.5 mg RTBID NEB  Last administered on 9/29/18at 09:37;  

Start 9/22/18 at 08:00


Warfarin Sodium (Coumadin) 1.5 mg 1X WARF  ONCE PO  Last administered on 9/22/ 18at 17:08;  Start 9/22/18 at 16:00;  Stop 9/22/18 at 16:01;  Status DC


Warfarin Sodium (Coumadin) 0.5 mg 1X WARF  ONCE PO ;  Start 9/22/18 at 16:00;  

Stop 9/22/18 at 16:00;  Status DC


Lactobacillus Rhamnosus (Culturelle) 1 cap BID PO  Last administered on 9/29/ 18at 19:25;  Start 9/22/18 at 21:00


Carbidopa/ Levodopa/ Entacapone (Stalevo 100) 1 tab PRN BID  PRN PO TREMORS;  

Start 9/22/18 at 17:00;  Stop 9/22/18 at 17:03;  Status DC


Carbidopa/Levodopa (Sinemet 25/100) 1 tab PRN BID  PRN PO TREMORS Last 

administered on 9/29/18at 15:45;  Start 9/22/18 at 17:15


Melatonin 3 mg QHS PO  Last administered on 9/29/18at 19:28;  Start 9/22/18 at 

21:00


Warfarin Sodium (Coumadin) 1.5 mg DAILY16 PO  Last administered on 9/25/18at 17:

32;  Start 9/23/18 at 16:00;  Stop 9/26/18 at 10:49;  Status DC


Sertraline HCl (Zoloft) 75 mg DAILY PO  Last administered on 9/29/18at 07:53;  

Start 9/24/18 at 09:00


Insulin Human Isoph/Insulin Regular (HumuLIN 70/30) 5 units TIDWMEALS SQ ;  

Start 9/25/18 at 12:00;  Stop 9/25/18 at 13:53;  Status DC


Insulin Human Lispro (HumaLOG) 5 units TIDWMEALS SQ  Last administered on 9/29/ 18at 17:34;  Start 9/25/18 at 17:00


Warfarin Sodium (Coumadin) 2 mg 1X WARF  ONCE PO  Last administered on 9/26/ 18at 17:29;  Start 9/26/18 at 16:00;  Stop 9/26/18 at 16:01;  Status DC


Lorazepam (Ativan) 1 mg HS PO  Last administered on 9/29/18at 19:28;  Start 9/26 /18 at 21:00


Lorazepam (Ativan) 0.5 mg DAILY PO  Last administered on 9/29/18at 07:45;  

Start 9/27/18 at 09:00;  Stop 9/30/18 at 08:50


Lorazepam (Ativan) 0.5 mg BID92 PO ;  Start 9/30/18 at 09:00


Trazodone HCl (Desyrel) 50 mg QHS PO  Last administered on 9/29/18at 19:29;  

Start 9/27/18 at 21:00


Trazodone HCl (Desyrel) 50 mg PRN QHS  PRN PO INSOMNIA;  Start 9/27/18 at 11:45


Warfarin Sodium (Coumadin) 2 mg DAILY16 PO  Last administered on 9/29/18at 17:18

;  Start 9/27/18 at 16:00





Active Scripts


Active


Reported


Omeprazole 20 Mg Capsule.dr 20 Mg PO DAILY


Claritin (Loratadine) 10 Mg Tablet 10 Mg PO DAILY


Ativan (Lorazepam) 1 Mg Tablet 1 Mg PO TID


Symbicort 160-4.5 Mcg Inhaler (Budesonide/Formoterol Fumarate) 10.2 Gm 

Hfa.aer.ad 2 Puff IH BID


Simethicone 80 Mg Tab.chew 80 Mg PO PRN QID PRN


Miralax (Polyethylene Glycol 3350) 17 Gm Powd.pack 17 Gm PO PRN DAILY PRN


Metoprolol Succinate ( Xl ) (Metoprolol Succinate) 100 Mg Tab.er.24h 100 Mg PO 

DAILY


Meclizine Hcl 25 Mg Tablet 25 Mg PO PRN TID PRN


Duoneb 0.5-3(2.5) Mg/3 Ml (Albuterol/Ipratropium) 3 Ml Ampul.neb 3 Ml NEB QID 

PRN


Artificial Tears Eye Drops (Dextran 70/Hypromellose) 15 Ml Drops 1 Drop EACHEYE 

QID PRN


Spiriva Respimat (Tiotropium Bromide) 4 Gm Mist.inhal 2.5 Gm IH DAILY


Vitamin C (Ascorbate Calcium) 500 Mg Tablet 500 Mg PO BID


Amantadine (Amantadine Hcl) 100 Mg Tablet 200 Mg PO BID


Tums (Calcium Carbonate) 200 Mg Tab.chew 400 Mg PO PRN TID PRN


Hiprex (Methenamine Hippurate) 1 Gm Tablet 1 Gm PO BID


Coumadin (Warfarin Sodium) 1 Mg Tablet 0.5 Mg PO DAILY


Coumadin (Warfarin Sodium) 1 Mg Tablet 1 Mg PO DAILY


Norco 5-325 Tablet (Hydrocodone Bit/Acetaminophen) 1 Each Tablet 1 Tab PO QID


Norco 5-325 Tablet (Hydrocodone Bit/Acetaminophen) 1 Each Tablet 1 Tab PO PRN 

BID PRN


Senna (Sennosides) 8.6 Mg Tablet 8.6 Mg PO DAILY


Dayana-Lanta Liquid (Mag Hydrox/Al Hydrox/Simeth) 355 Ml Oral.susp 30 Ml PO PRN 

Q6HRS PRN


Zofran (Ondansetron Hcl) 8 Mg Tablet 8 Mg PO PRN TID PRN


Zoloft (Sertraline Hcl) 50 Mg Tablet 50 Mg PO DAILY


Novolog (Insulin Aspart) 100 Unit/1 Ml Cartridge 5 Unit SQ TIDWMEALS


Robitussin Cough-Chest Dm Liq (Guaifenesin/Dextromethorphan) 237 Ml Liquid 5 Ml 

PO PRN Q4HRS PRN


Carbidopa-Levo  Mg Odt (Carbidopa/Levodopa) 1 Each Tab.rapdis 1 Tab PO QID


Simethicone 80 Mg Tab.chew 80 Mg PO PRN QID PRN


Lantus Solostar (Insulin Glargine,Hum.rec.anlog) 100 Unit/1 Ml Insuln.pen 26 

Unit SQ HS


Amitiza (Lubiprostone) 24 Mcg Capsule 24 Mcg PO DAILY


Klor-Con M20 (Potassium Chloride) 20 Meq Tab.er.prt 20 Meq PO DAILY


Benicar (Olmesartan Medoxomil) 5 Mg Tablet 5 Mg PO DAILY


Allopurinol 300 Mg Tablet 300 Mg PO DAILY


I have reviewed the current psychotropics carefully including drug 

interactions.  Risk benefit ratio favors no change other than as noted in my 

dictated progress note.





Diagnosis:


Problems:  


(1) Dementia


(2) MDD (major depressive disorder)


(3) Anxiety disorder


(4) Mild cognitive impairment











JULIANA CARDONA MD Sep 29, 2018 22:58

## 2018-09-30 VITALS — DIASTOLIC BLOOD PRESSURE: 85 MMHG | SYSTOLIC BLOOD PRESSURE: 146 MMHG

## 2018-09-30 VITALS — DIASTOLIC BLOOD PRESSURE: 63 MMHG | SYSTOLIC BLOOD PRESSURE: 148 MMHG

## 2018-09-30 LAB
ALBUMIN SERPL-MCNC: 3.1 G/DL (ref 3.4–5)
ALBUMIN/GLOB SERPL: 0.9 {RATIO} (ref 1–1.7)
ALP SERPL-CCNC: 73 U/L (ref 46–116)
ALT SERPL-CCNC: 16 U/L (ref 14–59)
ANION GAP SERPL CALC-SCNC: 5 MMOL/L (ref 6–14)
AST SERPL-CCNC: 22 U/L (ref 15–37)
BASOPHILS # BLD AUTO: 0.1 X10^3/UL (ref 0–0.2)
BASOPHILS NFR BLD: 1 % (ref 0–3)
BILIRUB SERPL-MCNC: 0.6 MG/DL (ref 0.2–1)
BUN/CREAT SERPL: 23 (ref 6–20)
CA-I SERPL ISE-MCNC: 21 MG/DL (ref 7–20)
CALCIUM SERPL-MCNC: 9.2 MG/DL (ref 8.5–10.1)
CHLORIDE SERPL-SCNC: 102 MMOL/L (ref 98–107)
CO2 SERPL-SCNC: 32 MMOL/L (ref 21–32)
CREAT SERPL-MCNC: 0.9 MG/DL (ref 0.6–1)
EOSINOPHIL NFR BLD: 0.6 X10^3/UL (ref 0–0.7)
EOSINOPHIL NFR BLD: 8 % (ref 0–3)
ERYTHROCYTE [DISTWIDTH] IN BLOOD BY AUTOMATED COUNT: 15.1 % (ref 11.5–14.5)
GFR SERPLBLD BASED ON 1.73 SQ M-ARVRAT: 61.5 ML/MIN
GLOBULIN SER-MCNC: 3.5 G/DL (ref 2.2–3.8)
GLUCOSE SERPL-MCNC: 173 MG/DL (ref 70–99)
HCT VFR BLD CALC: 39.3 % (ref 36–47)
HGB BLD-MCNC: 12.9 G/DL (ref 12–15.5)
LYMPHOCYTES # BLD: 2.7 X10^3/UL (ref 1–4.8)
LYMPHOCYTES NFR BLD AUTO: 35 % (ref 24–48)
MCH RBC QN AUTO: 31 PG (ref 25–35)
MCHC RBC AUTO-ENTMCNC: 33 G/DL (ref 31–37)
MCV RBC AUTO: 95 FL (ref 79–100)
MONO #: 0.6 X10^3/UL (ref 0–1.1)
MONOCYTES NFR BLD: 8 % (ref 0–9)
NEUT #: 3.7 X10^3UL (ref 1.8–7.7)
NEUTROPHILS NFR BLD AUTO: 48 % (ref 31–73)
PLATELET # BLD AUTO: 259 X10^3/UL (ref 140–400)
POTASSIUM SERPL-SCNC: 4.4 MMOL/L (ref 3.5–5.1)
PROT SERPL-MCNC: 6.6 G/DL (ref 6.4–8.2)
RBC # BLD AUTO: 4.16 X10^6/UL (ref 3.5–5.4)
SODIUM SERPL-SCNC: 139 MMOL/L (ref 136–145)
WBC # BLD AUTO: 7.7 X10^3/UL (ref 4–11)

## 2018-09-30 RX ADMIN — METOPROLOL SUCCINATE SCH MG: 50 TABLET, EXTENDED RELEASE ORAL at 07:30

## 2018-09-30 RX ADMIN — IPRATROPIUM BROMIDE AND ALBUTEROL SULFATE SCH ML: .5; 3 SOLUTION RESPIRATORY (INHALATION) at 09:46

## 2018-09-30 RX ADMIN — INSULIN LISPRO SCH UNITS: 100 INJECTION, SOLUTION INTRAVENOUS; SUBCUTANEOUS at 17:21

## 2018-09-30 RX ADMIN — CARBIDOPA AND LEVODOPA SCH TAB: 25; 250 TABLET ORAL at 05:39

## 2018-09-30 RX ADMIN — IPRATROPIUM BROMIDE AND ALBUTEROL SULFATE SCH ML: .5; 3 SOLUTION RESPIRATORY (INHALATION) at 05:38

## 2018-09-30 RX ADMIN — IPRATROPIUM BROMIDE AND ALBUTEROL SULFATE SCH ML: .5; 3 SOLUTION RESPIRATORY (INHALATION) at 20:00

## 2018-09-30 RX ADMIN — PANTOPRAZOLE SODIUM SCH MG: 40 TABLET, DELAYED RELEASE ORAL at 07:24

## 2018-09-30 RX ADMIN — OXYCODONE HYDROCHLORIDE AND ACETAMINOPHEN SCH MG: 500 TABLET ORAL at 07:31

## 2018-09-30 RX ADMIN — OXYCODONE HYDROCHLORIDE AND ACETAMINOPHEN SCH MG: 500 TABLET ORAL at 19:35

## 2018-09-30 RX ADMIN — CETIRIZINE HYDROCHLORIDE SCH MG: 10 TABLET, FILM COATED ORAL at 07:32

## 2018-09-30 RX ADMIN — HYDROCODONE BITARTRATE AND ACETAMINOPHEN SCH TAB: 5; 325 TABLET ORAL at 16:53

## 2018-09-30 RX ADMIN — AMANTADINE HYDROCHLORIDE SCH MG: 100 CAPSULE ORAL at 19:35

## 2018-09-30 RX ADMIN — WARFARIN SODIUM SCH MG: 2 TABLET ORAL at 16:52

## 2018-09-30 RX ADMIN — METHENAMINE HIPPURATE SCH GM: 1 TABLET ORAL at 07:27

## 2018-09-30 RX ADMIN — Medication SCH CAP: at 07:27

## 2018-09-30 RX ADMIN — CARBIDOPA AND LEVODOPA PRN TAB: 25; 100 TABLET ORAL at 18:45

## 2018-09-30 RX ADMIN — CARBIDOPA AND LEVODOPA SCH TAB: 25; 250 TABLET ORAL at 02:17

## 2018-09-30 RX ADMIN — Medication SCH CAP: at 19:35

## 2018-09-30 RX ADMIN — AMANTADINE HYDROCHLORIDE SCH MG: 100 CAPSULE ORAL at 07:30

## 2018-09-30 RX ADMIN — CARBIDOPA AND LEVODOPA SCH TAB: 25; 250 TABLET ORAL at 12:06

## 2018-09-30 RX ADMIN — CARBIDOPA AND LEVODOPA SCH TAB: 25; 250 TABLET ORAL at 16:54

## 2018-09-30 RX ADMIN — INSULIN LISPRO SCH UNITS: 100 INJECTION, SOLUTION INTRAVENOUS; SUBCUTANEOUS at 12:08

## 2018-09-30 RX ADMIN — SENNOSIDES A AND B SCH MG: 8.6 TABLET, FILM COATED ORAL at 07:28

## 2018-09-30 RX ADMIN — IPRATROPIUM BROMIDE AND ALBUTEROL SULFATE SCH ML: .5; 3 SOLUTION RESPIRATORY (INHALATION) at 16:49

## 2018-09-30 RX ADMIN — SERTRALINE HYDROCHLORIDE SCH MG: 50 TABLET ORAL at 07:32

## 2018-09-30 RX ADMIN — BUDESONIDE SCH MG: 0.5 SUSPENSION RESPIRATORY (INHALATION) at 09:46

## 2018-09-30 RX ADMIN — POLYETHYLENE GLYCOL 3350 SCH GM: 17 POWDER, FOR SOLUTION ORAL at 07:28

## 2018-09-30 RX ADMIN — BUDESONIDE SCH MG: 0.5 SUSPENSION RESPIRATORY (INHALATION) at 08:00

## 2018-09-30 RX ADMIN — POTASSIUM CHLORIDE SCH MEQ: 1500 TABLET, EXTENDED RELEASE ORAL at 07:28

## 2018-09-30 RX ADMIN — MELATONIN TAB 3 MG SCH MG: 3 TAB at 19:35

## 2018-09-30 RX ADMIN — LOSARTAN POTASSIUM SCH MG: 25 TABLET, FILM COATED ORAL at 07:27

## 2018-09-30 RX ADMIN — TRAZODONE HYDROCHLORIDE SCH MG: 50 TABLET, FILM COATED ORAL at 19:35

## 2018-09-30 RX ADMIN — HYDROCODONE BITARTRATE AND ACETAMINOPHEN SCH TAB: 5; 325 TABLET ORAL at 07:35

## 2018-09-30 RX ADMIN — METHENAMINE HIPPURATE SCH GM: 1 TABLET ORAL at 19:35

## 2018-09-30 RX ADMIN — HYDROCODONE BITARTRATE AND ACETAMINOPHEN SCH TAB: 5; 325 TABLET ORAL at 12:07

## 2018-09-30 RX ADMIN — LUBIPROSTONE SCH MCG: 24 CAPSULE, GELATIN COATED ORAL at 07:26

## 2018-09-30 RX ADMIN — ALLOPURINOL SCH MG: 300 TABLET ORAL at 07:32

## 2018-09-30 RX ADMIN — INSULIN GLARGINE SCH UNITS: 100 INJECTION, SOLUTION SUBCUTANEOUS at 19:43

## 2018-09-30 RX ADMIN — HYDROCODONE BITARTRATE AND ACETAMINOPHEN SCH TAB: 5; 325 TABLET ORAL at 19:37

## 2018-09-30 RX ADMIN — INSULIN LISPRO SCH UNITS: 100 INJECTION, SOLUTION INTRAVENOUS; SUBCUTANEOUS at 07:24

## 2018-09-30 NOTE — PDOC
Exam


Note:


[facility] Note:


Please also refer to the separate dictated note~for this date of service 

dictated separately.~Patient seen individually. Discussed the patient with 

Nursing staff reviewed the chart.~Reviewed interim history and current 

functioning. Reviewed vital signs,~Labs/ Radiology~and current medications 

noted below. Continue current treatment with the changes noted in the dictated 

addendum note





Assessment:


Vital Signs:





 Vital Signs








  Date Time  Temp Pulse Resp B/P (MAP) Pulse Ox O2 Delivery O2 Flow Rate FiO2


 


9/30/18 19:37     98   


 


9/30/18 18:00   14   Room Air  


 


9/30/18 15:06 97.7 65  146/85 (105)    








I&O











Intake and Output 


 


 9/30/18





 07:00


 


Intake Total 720 ml


 


Balance 720 ml


 


 


 


Intake Oral 600 ml


 


Tube Feeding 120 ml


 


# Bowel Movements 1








Labs:





 Laboratory Tests








Test


 9/30/18


07:07 9/30/18


09:22 9/30/18


11:27 9/30/18


17:00


 


Glucose (Fingerstick)


 98 mg/dL


(70-99) 


 199 mg/dL


(70-99)  H 191 mg/dL


(70-99)  H


 


White Blood Count


 


 7.7 x10^3/uL


(4.0-11.0) 


 





 


Red Blood Count


 


 4.16 x10^6/uL


(3.50-5.40) 


 





 


Hemoglobin


 


 12.9 g/dL


(12.0-15.5) 


 





 


Hematocrit


 


 39.3 %


(36.0-47.0) 


 





 


Mean Corpuscular Volume


 


 95 fL ()


 


 





 


Mean Corpuscular Hemoglobin  31 pg (25-35)    


 


Mean Corpuscular Hemoglobin


Concent 


 33 g/dL


(31-37) 


 





 


Red Cell Distribution Width


 


 15.1 %


(11.5-14.5)  H 


 





 


Platelet Count


 


 259 x10^3/uL


(140-400) 


 





 


Neutrophils (%) (Auto)  48 % (31-73)    


 


Lymphocytes (%) (Auto)  35 % (24-48)    


 


Monocytes (%) (Auto)  8 % (0-9)    


 


Eosinophils (%) (Auto)  8 % (0-3)  H  


 


Basophils (%) (Auto)  1 % (0-3)    


 


Neutrophils # (Auto)


 


 3.7 x10^3uL


(1.8-7.7) 


 





 


Lymphocytes # (Auto)


 


 2.7 x10^3/uL


(1.0-4.8) 


 





 


Monocytes # (Auto)


 


 0.6 x10^3/uL


(0.0-1.1) 


 





 


Eosinophils # (Auto)


 


 0.6 x10^3/uL


(0.0-0.7) 


 





 


Basophils # (Auto)


 


 0.1 x10^3/uL


(0.0-0.2) 


 





 


Sodium Level


 


 139 mmol/L


(136-145) 


 





 


Potassium Level


 


 4.4 mmol/L


(3.5-5.1) 


 





 


Chloride Level


 


 102 mmol/L


() 


 





 


Carbon Dioxide Level


 


 32 mmol/L


(21-32) 


 





 


Anion Gap  5 (6-14)  L  


 


Blood Urea Nitrogen


 


 21 mg/dL


(7-20)  H 


 





 


Creatinine


 


 0.9 mg/dL


(0.6-1.0) 


 





 


Estimated GFR


(Cockcroft-Gault) 


 61.5  


 


 





 


BUN/Creatinine Ratio  23 (6-20)  H  


 


Glucose Level


 


 173 mg/dL


(70-99)  H 


 





 


Calcium Level


 


 9.2 mg/dL


(8.5-10.1) 


 





 


Total Bilirubin


 


 0.6 mg/dL


(0.2-1.0) 


 





 


Aspartate Amino Transferase


(AST) 


 22 U/L (15-37)


 


 





 


Alanine Aminotransferase (ALT)


 


 16 U/L (14-59)


 


 





 


Alkaline Phosphatase


 


 73 U/L


() 


 





 


Total Protein


 


 6.6 g/dL


(6.4-8.2) 


 





 


Albumin


 


 3.1 g/dL


(3.4-5.0)  L 


 





 


Albumin/Globulin Ratio


 


 0.9 (1.0-1.7)


L 


 














Current Medications:


Meds:





Current Medications


Cephalexin HCl (Keflex) 500 mg 1X  ONCE PO  Last administered on 9/21/18at 23:21

;  Start 9/21/18 at 23:15;  Stop 9/21/18 at 23:16;  Status DC


Insulin Human Lispro (HumaLOG) 0-5 UNITS TIDWMEALS SQ  Last administered on 9/25 /18at 11:54;  Start 9/22/18 at 08:00;  Stop 9/25/18 at 13:54;  Status DC


Dextrose 12.5 gm PRN Q15MIN  PRN IV SEE COMMENTS;  Start 9/21/18 at 23:00


Cephalexin HCl (Keflex) 500 mg TID PO  Last administered on 9/28/18at 19:03;  

Start 9/22/18 at 09:00;  Stop 9/29/18 at 08:59;  Status DC


Lorazepam (Ativan) 0.5 mg 1X  ONCE PO  Last administered on 9/21/18at 23:21;  

Start 9/21/18 at 23:15;  Stop 9/21/18 at 23:16;  Status DC


Famotidine (Pepcid) 20 mg 1X  ONCE PO  Last administered on 9/21/18at 23:21;  

Start 9/21/18 at 23:15;  Stop 9/21/18 at 23:16;  Status DC


Acetaminophen (Tylenol) 650 mg PRN Q6HRS  PRN PO PAIN / TEMP;  Start 9/22/18 at 

00:15


Multi-Ingredient Ointment (Analgesic Balm) 1 mami PRN QID  PRN TP MUSCLE PAIN;  

Start 9/22/18 at 00:15


Magnesium Hydroxide (Milk Of Magnesia) 2,400 mg PRN QHS  PRN PO CONSTIPATION 

Last administered on 9/28/18at 19:26;  Start 9/22/18 at 00:15


Calcium Carbonate/ Glycine (Tums) 400 mg PRN TID  PRN PO INDIGESTION;  Start 9/ 22/18 at 00:15


Insulin Glargine (Lantus) 26 units HS SQ  Last administered on 9/30/18at 19:43;

  Start 9/22/18 at 21:00


Albuterol Sulfate (Ventolin) 2.5 mg PRN QID  PRN NEB SHORTNESS OF BREATH;  

Start 9/22/18 at 00:15


Lubiprostone (Amitiza) 24 mcg DAILY PO  Last administered on 9/30/18at 07:26;  

Start 9/22/18 at 09:00


Methenamine Hippurate (Hiprex) 1 gm BID PO  Last administered on 9/30/18at 19:35

;  Start 9/22/18 at 09:00


Potassium Chloride (Klor-Con) 20 meq DAILY PO  Last administered on 9/30/18at 07

:28;  Start 9/22/18 at 09:00


Simethicone (Gas-X) 80 mg PRN QID  PRN PO GAS / BLOATING;  Start 9/22/18 at 00:

15


Allopurinol (Zyloprim) 300 mg DAILY PO  Last administered on 9/30/18at 07:32;  

Start 9/22/18 at 09:00


Amantadine HCl (Symmetrel) 200 mg BID PO  Last administered on 9/30/18at 19:35;

  Start 9/22/18 at 09:00


Ascorbic Acid (Vitamin C) 500 mg BID PO  Last administered on 9/30/18at 19:35;  

Start 9/22/18 at 09:00


Carbidopa/Levodopa (Sinemet 25/250) 1 tab [id number] PO  Last administered on 9/ 30/18at 16:54;  Start 9/22/18 at 06:00


Artificial Tears (Artificial Tears) 1 drop PRN Q6HRS  PRN OU DRY EYE;  Start 9/ 22/18 at 00:15


Guaifenesin (Robitussin Dm) 5 ml PRN Q4HRS  PRN PO COUGH;  Start 9/22/18 at 00:

15


Acetaminophen/ Hydrocodone Bitart (Lortab 5/325) 1 tab PRN BID  PRN PO PAIN 

Last administered on 9/28/18at 05:40;  Start 9/22/18 at 00:15


Acetaminophen/ Hydrocodone Bitart (Lortab 5/325) 1 tab QID PO  Last 

administered on 9/30/18at 19:37;  Start 9/22/18 at 09:00


Insulin Aspart (Novolog Mix 70-30) 5 units TIDWMEALS SQ  Last administered on 9/ 24/18at 17:00;  Start 9/22/18 at 08:00;  Stop 9/25/18 at 11:59;  Status DC


Cetirizine HCl (ZyrTEC) 10 mg DAILY PO  Last administered on 9/30/18at 07:32;  

Start 9/22/18 at 09:00


Al Hydroxide/Mg Hydroxide (Mylanta Plus Xs) 30 ml PRN Q6HRS  PRN PO DYSPEPSIA;  

Start 9/22/18 at 00:15


Meclizine HCl (Antivert) 25 mg PRN TID  PRN PO DIZZINESS;  Start 9/22/18 at 00:

15


Metoprolol Succinate (Toprol Xl) 100 mg DAILY PO  Last administered on 9/30/ 18at 07:30;  Start 9/22/18 at 09:00


Ondansetron HCl (Zofran Odt) 8 mg PRN TID  PRN PO NAUSEA/VOMITING;  Start 9/22/ 18 at 00:15


Polyethylene Glycol (miraLAX) 17 gm DAILY PO  Last administered on 9/30/18at 07:

28;  Start 9/22/18 at 09:00


Sennosides (Senna) 8.6 mg DAILY PO  Last administered on 9/30/18at 07:28;  

Start 9/22/18 at 09:00


Warfarin Sodium (Coumadin Per Pharmacy) 1 each PRN DAILY  PRN MC SEE COMMENTS 

Last administered on 9/29/18at 09:26;  Start 9/22/18 at 00:15


Warfarin Sodium (Coumadin) 1.5 mg DAILY PO ;  Start 9/22/18 at 09:00;  Status 

UNV


Simethicone (Gas-X) 80 mg PRN QID  PRN PO GAS / BLOATING;  Start 9/22/18 at 00:

15;  Status UNV


Non-Formulary Medication (Budesonide/ Formoterol Fumarate (Symbicort 160-4.5 

Mcg Inhaler)) 2 puff BID IH ;  Start 9/22/18 at 09:00;  Stop 9/22/18 at 09:00;  

Status DC


Losartan Potassium (Cozaar) 25 mg DAILY PO  Last administered on 9/30/18at 07:27

;  Start 9/22/18 at 09:00


Pantoprazole Sodium (Protonix) 40 mg DAILYAC PO  Last administered on 9/30/18at 

07:24;  Start 9/22/18 at 07:30


Non-Formulary Medication (Tiotropium Bromide (Spiriva Respimat)) 2.5 gm DAILY 

IH ;  Start 9/22/18 at 09:00;  Stop 9/22/18 at 09:00;  Status DC


Lorazepam (Ativan) 1 mg TID PO  Last administered on 9/26/18at 12:42;  Start 9/ 22/18 at 09:00;  Stop 9/26/18 at 18:03;  Status DC


Sertraline HCl (Zoloft) 50 mg DAILY PO  Last administered on 9/24/18at 08:04;  

Start 9/22/18 at 09:00;  Stop 9/24/18 at 09:00;  Status DC


Info (Anti-Coagulation Monitoring By Pharmacy) 1 each PRN DAILY  PRN MC SEE 

COMMENTS;  Start 9/22/18 at 09:00;  Status Cancel


Albuterol/ Ipratropium (Duoneb) 3 ml RTQID NEB  Last administered on 9/30/18at 

16:49;  Start 9/22/18 at 08:00


Budesonide (Pulmicort) 0.5 mg RTBID NEB  Last administered on 9/30/18at 09:46;  

Start 9/22/18 at 08:00


Warfarin Sodium (Coumadin) 1.5 mg 1X WARF  ONCE PO  Last administered on 9/22/ 18at 17:08;  Start 9/22/18 at 16:00;  Stop 9/22/18 at 16:01;  Status DC


Warfarin Sodium (Coumadin) 0.5 mg 1X WARF  ONCE PO ;  Start 9/22/18 at 16:00;  

Stop 9/22/18 at 16:00;  Status DC


Lactobacillus Rhamnosus (Culturelle) 1 cap BID PO  Last administered on 9/30/ 18at 19:35;  Start 9/22/18 at 21:00


Carbidopa/ Levodopa/ Entacapone (Stalevo 100) 1 tab PRN BID  PRN PO TREMORS;  

Start 9/22/18 at 17:00;  Stop 9/22/18 at 17:03;  Status DC


Carbidopa/Levodopa (Sinemet 25/100) 1 tab PRN BID  PRN PO TREMORS Last 

administered on 9/30/18at 18:45;  Start 9/22/18 at 17:15


Melatonin 3 mg QHS PO  Last administered on 9/30/18at 19:35;  Start 9/22/18 at 

21:00


Warfarin Sodium (Coumadin) 1.5 mg DAILY16 PO  Last administered on 9/25/18at 17:

32;  Start 9/23/18 at 16:00;  Stop 9/26/18 at 10:49;  Status DC


Sertraline HCl (Zoloft) 75 mg DAILY PO  Last administered on 9/30/18at 07:32;  

Start 9/24/18 at 09:00


Insulin Human Isoph/Insulin Regular (HumuLIN 70/30) 5 units TIDWMEALS SQ ;  

Start 9/25/18 at 12:00;  Stop 9/25/18 at 13:53;  Status DC


Insulin Human Lispro (HumaLOG) 5 units TIDWMEALS SQ  Last administered on 9/30/ 18at 17:21;  Start 9/25/18 at 17:00


Warfarin Sodium (Coumadin) 2 mg 1X WARF  ONCE PO  Last administered on 9/26/ 18at 17:29;  Start 9/26/18 at 16:00;  Stop 9/26/18 at 16:01;  Status DC


Lorazepam (Ativan) 1 mg HS PO  Last administered on 9/30/18at 19:37;  Start 9/26 /18 at 21:00;  Stop 10/3/18 at 01:00


Lorazepam (Ativan) 0.5 mg DAILY PO  Last administered on 9/29/18at 07:45;  

Start 9/27/18 at 09:00;  Stop 9/30/18 at 08:50;  Status DC


Lorazepam (Ativan) 0.5 mg BID92 PO  Last administered on 9/30/18at 15:04;  

Start 9/30/18 at 09:00;  Stop 10/3/18 at 01:00


Trazodone HCl (Desyrel) 50 mg QHS PO  Last administered on 9/30/18at 19:35;  

Start 9/27/18 at 21:00


Trazodone HCl (Desyrel) 50 mg PRN QHS  PRN PO INSOMNIA;  Start 9/27/18 at 11:45


Warfarin Sodium (Coumadin) 2 mg DAILY16 PO  Last administered on 9/30/18at 16:52

;  Start 9/27/18 at 16:00


Lorazepam (Ativan) 0.5 mg TID PO ;  Start 10/3/18 at 09:00





Active Scripts


Active


Reported


Omeprazole 20 Mg Capsule.dr 20 Mg PO DAILY


Claritin (Loratadine) 10 Mg Tablet 10 Mg PO DAILY


Ativan (Lorazepam) 1 Mg Tablet 1 Mg PO TID


Symbicort 160-4.5 Mcg Inhaler (Budesonide/Formoterol Fumarate) 10.2 Gm 

Hfa.aer.ad 2 Puff IH BID


Simethicone 80 Mg Tab.chew 80 Mg PO PRN QID PRN


Miralax (Polyethylene Glycol 3350) 17 Gm Powd.pack 17 Gm PO PRN DAILY PRN


Metoprolol Succinate ( Xl ) (Metoprolol Succinate) 100 Mg Tab.er.24h 100 Mg PO 

DAILY


Meclizine Hcl 25 Mg Tablet 25 Mg PO PRN TID PRN


Duoneb 0.5-3(2.5) Mg/3 Ml (Albuterol/Ipratropium) 3 Ml Ampul.neb 3 Ml NEB QID 

PRN


Artificial Tears Eye Drops (Dextran 70/Hypromellose) 15 Ml Drops 1 Drop EACHEYE 

QID PRN


Spiriva Respimat (Tiotropium Bromide) 4 Gm Mist.inhal 2.5 Gm IH DAILY


Vitamin C (Ascorbate Calcium) 500 Mg Tablet 500 Mg PO BID


Amantadine (Amantadine Hcl) 100 Mg Tablet 200 Mg PO BID


Tums (Calcium Carbonate) 200 Mg Tab.chew 400 Mg PO PRN TID PRN


Hiprex (Methenamine Hippurate) 1 Gm Tablet 1 Gm PO BID


Coumadin (Warfarin Sodium) 1 Mg Tablet 0.5 Mg PO DAILY


Coumadin (Warfarin Sodium) 1 Mg Tablet 1 Mg PO DAILY


Norco 5-325 Tablet (Hydrocodone Bit/Acetaminophen) 1 Each Tablet 1 Tab PO QID


Norco 5-325 Tablet (Hydrocodone Bit/Acetaminophen) 1 Each Tablet 1 Tab PO PRN 

BID PRN


Senna (Sennosides) 8.6 Mg Tablet 8.6 Mg PO DAILY


Dayana-Lanta Liquid (Mag Hydrox/Al Hydrox/Simeth) 355 Ml Oral.susp 30 Ml PO PRN 

Q6HRS PRN


Zofran (Ondansetron Hcl) 8 Mg Tablet 8 Mg PO PRN TID PRN


Zoloft (Sertraline Hcl) 50 Mg Tablet 50 Mg PO DAILY


Novolog (Insulin Aspart) 100 Unit/1 Ml Cartridge 5 Unit SQ TIDWMEALS


Robitussin Cough-Chest Dm Liq (Guaifenesin/Dextromethorphan) 237 Ml Liquid 5 Ml 

PO PRN Q4HRS PRN


Carbidopa-Levo  Mg Odt (Carbidopa/Levodopa) 1 Each Tab.rapdis 1 Tab PO QID


Simethicone 80 Mg Tab.chew 80 Mg PO PRN QID PRN


Lantus Solostar (Insulin Glargine,Hum.rec.anlog) 100 Unit/1 Ml Insuln.pen 26 

Unit SQ HS


Amitiza (Lubiprostone) 24 Mcg Capsule 24 Mcg PO DAILY


Klor-Con M20 (Potassium Chloride) 20 Meq Tab.er.prt 20 Meq PO DAILY


Benicar (Olmesartan Medoxomil) 5 Mg Tablet 5 Mg PO DAILY


Allopurinol 300 Mg Tablet 300 Mg PO DAILY


I have reviewed the current psychotropics carefully including drug 

interactions.  Risk benefit ratio favors no change other than as noted in my 

dictated progress note.





Diagnosis:


Problems:  


(1) Dementia


(2) UTI (urinary tract infection)


(3) MDD (major depressive disorder)


(4) Anxiety disorder


(5) Mild cognitive impairment











JULIANA CARDONA MD Sep 30, 2018 19:59

## 2018-10-01 VITALS — SYSTOLIC BLOOD PRESSURE: 127 MMHG | DIASTOLIC BLOOD PRESSURE: 68 MMHG

## 2018-10-01 VITALS — SYSTOLIC BLOOD PRESSURE: 127 MMHG | DIASTOLIC BLOOD PRESSURE: 71 MMHG

## 2018-10-01 RX ADMIN — MELATONIN TAB 3 MG SCH MG: 3 TAB at 19:48

## 2018-10-01 RX ADMIN — IPRATROPIUM BROMIDE AND ALBUTEROL SULFATE SCH ML: .5; 3 SOLUTION RESPIRATORY (INHALATION) at 04:53

## 2018-10-01 RX ADMIN — METHENAMINE HIPPURATE SCH GM: 1 TABLET ORAL at 08:34

## 2018-10-01 RX ADMIN — CARBIDOPA AND LEVODOPA SCH TAB: 25; 250 TABLET ORAL at 11:54

## 2018-10-01 RX ADMIN — LOSARTAN POTASSIUM SCH MG: 25 TABLET, FILM COATED ORAL at 08:27

## 2018-10-01 RX ADMIN — HYDROCODONE BITARTRATE AND ACETAMINOPHEN SCH TAB: 5; 325 TABLET ORAL at 13:44

## 2018-10-01 RX ADMIN — CARBIDOPA AND LEVODOPA PRN TAB: 25; 100 TABLET ORAL at 10:11

## 2018-10-01 RX ADMIN — INSULIN LISPRO SCH UNITS: 100 INJECTION, SOLUTION INTRAVENOUS; SUBCUTANEOUS at 17:27

## 2018-10-01 RX ADMIN — INSULIN LISPRO SCH UNITS: 100 INJECTION, SOLUTION INTRAVENOUS; SUBCUTANEOUS at 12:17

## 2018-10-01 RX ADMIN — HYDROCODONE BITARTRATE AND ACETAMINOPHEN PRN TAB: 5; 325 TABLET ORAL at 10:10

## 2018-10-01 RX ADMIN — PANTOPRAZOLE SODIUM SCH MG: 40 TABLET, DELAYED RELEASE ORAL at 08:24

## 2018-10-01 RX ADMIN — TRAZODONE HYDROCHLORIDE SCH MG: 50 TABLET, FILM COATED ORAL at 19:50

## 2018-10-01 RX ADMIN — WARFARIN SODIUM SCH MG: 2 TABLET ORAL at 17:09

## 2018-10-01 RX ADMIN — INSULIN GLARGINE SCH UNITS: 100 INJECTION, SOLUTION SUBCUTANEOUS at 19:52

## 2018-10-01 RX ADMIN — IPRATROPIUM BROMIDE AND ALBUTEROL SULFATE SCH ML: .5; 3 SOLUTION RESPIRATORY (INHALATION) at 16:43

## 2018-10-01 RX ADMIN — SERTRALINE HYDROCHLORIDE SCH MG: 50 TABLET ORAL at 08:31

## 2018-10-01 RX ADMIN — ALLOPURINOL SCH MG: 300 TABLET ORAL at 08:31

## 2018-10-01 RX ADMIN — CARBIDOPA AND LEVODOPA SCH TAB: 25; 250 TABLET ORAL at 05:48

## 2018-10-01 RX ADMIN — AMANTADINE HYDROCHLORIDE SCH MG: 100 CAPSULE ORAL at 08:33

## 2018-10-01 RX ADMIN — METHENAMINE HIPPURATE SCH GM: 1 TABLET ORAL at 19:50

## 2018-10-01 RX ADMIN — OXYCODONE HYDROCHLORIDE AND ACETAMINOPHEN SCH MG: 500 TABLET ORAL at 08:30

## 2018-10-01 RX ADMIN — METOPROLOL SUCCINATE SCH MG: 50 TABLET, EXTENDED RELEASE ORAL at 08:30

## 2018-10-01 RX ADMIN — Medication SCH CAP: at 08:27

## 2018-10-01 RX ADMIN — Medication SCH CAP: at 19:49

## 2018-10-01 RX ADMIN — BUDESONIDE SCH MG: 0.5 SUSPENSION RESPIRATORY (INHALATION) at 20:38

## 2018-10-01 RX ADMIN — IPRATROPIUM BROMIDE AND ALBUTEROL SULFATE SCH ML: .5; 3 SOLUTION RESPIRATORY (INHALATION) at 20:34

## 2018-10-01 RX ADMIN — HYDROCODONE BITARTRATE AND ACETAMINOPHEN SCH TAB: 5; 325 TABLET ORAL at 17:09

## 2018-10-01 RX ADMIN — POTASSIUM CHLORIDE SCH MEQ: 1500 TABLET, EXTENDED RELEASE ORAL at 08:27

## 2018-10-01 RX ADMIN — CARBIDOPA AND LEVODOPA SCH TAB: 25; 250 TABLET ORAL at 17:10

## 2018-10-01 RX ADMIN — POLYETHYLENE GLYCOL 3350 SCH GM: 17 POWDER, FOR SOLUTION ORAL at 08:29

## 2018-10-01 RX ADMIN — IPRATROPIUM BROMIDE AND ALBUTEROL SULFATE SCH ML: .5; 3 SOLUTION RESPIRATORY (INHALATION) at 10:27

## 2018-10-01 RX ADMIN — LUBIPROSTONE SCH MCG: 24 CAPSULE, GELATIN COATED ORAL at 08:26

## 2018-10-01 RX ADMIN — HYDROCODONE BITARTRATE AND ACETAMINOPHEN SCH TAB: 5; 325 TABLET ORAL at 08:29

## 2018-10-01 RX ADMIN — CETIRIZINE HYDROCHLORIDE SCH MG: 10 TABLET, FILM COATED ORAL at 08:31

## 2018-10-01 RX ADMIN — INSULIN LISPRO SCH UNITS: 100 INJECTION, SOLUTION INTRAVENOUS; SUBCUTANEOUS at 08:37

## 2018-10-01 RX ADMIN — SENNOSIDES A AND B SCH MG: 8.6 TABLET, FILM COATED ORAL at 08:29

## 2018-10-01 RX ADMIN — HYDROCODONE BITARTRATE AND ACETAMINOPHEN SCH TAB: 5; 325 TABLET ORAL at 19:51

## 2018-10-01 RX ADMIN — OXYCODONE HYDROCHLORIDE AND ACETAMINOPHEN SCH MG: 500 TABLET ORAL at 19:51

## 2018-10-01 RX ADMIN — BUDESONIDE SCH MG: 0.5 SUSPENSION RESPIRATORY (INHALATION) at 10:27

## 2018-10-01 RX ADMIN — CARBIDOPA AND LEVODOPA SCH TAB: 25; 250 TABLET ORAL at 00:25

## 2018-10-01 RX ADMIN — AMANTADINE HYDROCHLORIDE SCH MG: 100 CAPSULE ORAL at 19:51

## 2018-10-01 RX ADMIN — CARBIDOPA AND LEVODOPA PRN TAB: 25; 100 TABLET ORAL at 20:05

## 2018-10-01 NOTE — PN
DATE:  09/30/2018



This late entry 09/30/2018 covers elements not covered in my initial note.



SUBJECTIVE:  I met with the patient in the evening.  The patient slept 7-3/4

hours previous evening.  Tremors are better.  She is compliant with medications.



REVIEW OF SYSTEMS:  Ambulation impaired, in wheelchair and she has other

symptoms reflective of her Parkinson's.  No CV, , pulmonary, eye system

symptoms on review.



MENTAL STATUS EXAM:  Reasonably oriented.  Speech has some latency, coherent. 

Abstraction fair, computation impaired, language function intact.  Mood and

affect still depressed, anxious, but improved.



LABORATORY DATA:  Reviewed.



IMPRESSION:  Unchanged from initial note, major depressive disorder, in partial

remission; anxiety disorder, unspecified; Parkinson's disease, status post

urinary tract infection.



PLAN:  We will reduce the Ativan by 0.5 mg on 2 steps.  She is currently on 0.5

mg at 0900, 1400 and 1 mg at bedtime.  After 3 days of the last reduction, we

will reduce by another 0.5 mg down to 0.5 mg t.i.d.  Rest unchanged from initial

note, Zoloft 75 mg a day.





______________________________

MAN ALEXANDR CARDONA MD



DR:  RANCHO/angel  JOB#:  [id number] / [id number]

DD:  10/01/2018 12:26  DT:  10/01/2018 20:16

## 2018-10-01 NOTE — PN
DATE:  09/29/2018



This late entry 09/29/2018 covers elements not covered in my initial note.



SUBJECTIVE:  I met with the patient in the evening.  Per nursing report, the

patient has been calm, cooperative, compliant with medications.  She slept 6-1/4

hours previous evening.  As I met with her, she was somewhat obsessing whether

her UA would be repeated after she completes her antibiotics to make sure the

UTI has resolved.  Some of this is understandable and I discussed we normally do

not repeat UA at the end of course of antibiotics, but we will reassess

depending on what symptoms she has remaining.  She is still somewhat depressed,

withdrawn, but improved.



REVIEW OF SYSTEMS:  Ambulation impaired, in wheelchair.  No CV, , pulmonary,

eye system symptoms on review.  Gait unsteady secondary to Parkinson's and

movements consistent with Parkinson's.



MENTAL STATUS EXAM:  Reasonably oriented.  Speech is coherent, has some latency.

 Abstraction fair.  Computation, unable to do more than one step serial 7's.  No

suicidal or homicidal ideation.  Mood and affect remain somewhat depressed,

anxious.



LABORATORY DATA:  Reviewed.



IMPRESSION:  Major depressive disorder, cognitive disorder, unspecified;

Parkinson's disease.  Rest unchanged.  Status post urinary tract infection.



PLAN:  No change from initial note.





______________________________

JULIANA CARDONA MD



DR:  RANCHO/angel  JOB#:  [id number] / [id number]

DD:  10/01/2018 12:24  DT:  10/01/2018 20:10

## 2018-10-01 NOTE — PDOC
Exam


Note:


[facility] Note:


Please also refer to the separate dictated note~for this date of service 

dictated separately.~Patient seen individually. Discussed the patient with 

Nursing staff reviewed the chart.~Reviewed interim history and current 

functioning. Reviewed vital signs,~Labs/ Radiology~and current medications 

noted below. Continue current treatment with the changes noted in the dictated 

addendum note





Assessment:


Vital Signs:





 Vital Signs








  Date Time  Temp Pulse Resp B/P (MAP) Pulse Ox O2 Delivery O2 Flow Rate FiO2


 


10/1/18 19:51     98 Room Air  


 


10/1/18 18:33   14     


 


10/1/18 16:05 98.0 65  127/71 (89)    








I&O











Intake and Output 


 


 10/1/18





 07:00


 


Intake Total 1440 ml


 


Balance 1440 ml


 


 


 


Intake Oral 1320 ml


 


Tube Feeding 120 ml


 


# Bowel Movements 1








Labs:





 Laboratory Tests








Test


 10/1/18


07:23 10/1/18


12:00 10/1/18


16:59


 


Glucose (Fingerstick)


 95 mg/dL


(70-99) 161 mg/dL


(70-99)  H 167 mg/dL


(70-99)  H











Current Medications:


Meds:





Current Medications


Cephalexin HCl (Keflex) 500 mg 1X  ONCE PO  Last administered on 9/21/18at 23:21

;  Start 9/21/18 at 23:15;  Stop 9/21/18 at 23:16;  Status DC


Insulin Human Lispro (HumaLOG) 0-5 UNITS TIDWMEALS SQ  Last administered on 9/25 /18at 11:54;  Start 9/22/18 at 08:00;  Stop 9/25/18 at 13:54;  Status DC


Dextrose 12.5 gm PRN Q15MIN  PRN IV SEE COMMENTS;  Start 9/21/18 at 23:00


Cephalexin HCl (Keflex) 500 mg TID PO  Last administered on 9/28/18at 19:03;  

Start 9/22/18 at 09:00;  Stop 9/29/18 at 08:59;  Status DC


Lorazepam (Ativan) 0.5 mg 1X  ONCE PO  Last administered on 9/21/18at 23:21;  

Start 9/21/18 at 23:15;  Stop 9/21/18 at 23:16;  Status DC


Famotidine (Pepcid) 20 mg 1X  ONCE PO  Last administered on 9/21/18at 23:21;  

Start 9/21/18 at 23:15;  Stop 9/21/18 at 23:16;  Status DC


Acetaminophen (Tylenol) 650 mg PRN Q6HRS  PRN PO PAIN / TEMP;  Start 9/22/18 at 

00:15


Multi-Ingredient Ointment (Analgesic Balm) 1 mami PRN QID  PRN TP MUSCLE PAIN;  

Start 9/22/18 at 00:15


Magnesium Hydroxide (Milk Of Magnesia) 2,400 mg PRN QHS  PRN PO CONSTIPATION 

Last administered on 9/28/18at 19:26;  Start 9/22/18 at 00:15


Calcium Carbonate/ Glycine (Tums) 400 mg PRN TID  PRN PO INDIGESTION;  Start 9/ 22/18 at 00:15


Insulin Glargine (Lantus) 26 units HS SQ  Last administered on 10/1/18at 19:52;

  Start 9/22/18 at 21:00


Albuterol Sulfate (Ventolin) 2.5 mg PRN QID  PRN NEB SHORTNESS OF BREATH;  

Start 9/22/18 at 00:15


Lubiprostone (Amitiza) 24 mcg DAILY PO  Last administered on 10/1/18at 08:26;  

Start 9/22/18 at 09:00


Methenamine Hippurate (Hiprex) 1 gm BID PO  Last administered on 10/1/18at 19:50

;  Start 9/22/18 at 09:00


Potassium Chloride (Klor-Con) 20 meq DAILY PO  Last administered on 10/1/18at 08

:27;  Start 9/22/18 at 09:00


Simethicone (Gas-X) 80 mg PRN QID  PRN PO GAS / BLOATING;  Start 9/22/18 at 00:

15


Allopurinol (Zyloprim) 300 mg DAILY PO  Last administered on 10/1/18at 08:31;  

Start 9/22/18 at 09:00


Amantadine HCl (Symmetrel) 200 mg BID PO  Last administered on 10/1/18at 19:51;

  Start 9/22/18 at 09:00


Ascorbic Acid (Vitamin C) 500 mg BID PO  Last administered on 10/1/18at 19:51;  

Start 9/22/18 at 09:00


Carbidopa/Levodopa (Sinemet 25/250) 1 tab [id number] PO  Last administered on 10/

1/18at 17:10;  Start 9/22/18 at 06:00


Artificial Tears (Artificial Tears) 1 drop PRN Q6HRS  PRN OU DRY EYE;  Start 9/ 22/18 at 00:15


Guaifenesin (Robitussin Dm) 5 ml PRN Q4HRS  PRN PO COUGH;  Start 9/22/18 at 00:

15


Acetaminophen/ Hydrocodone Bitart (Lortab 5/325) 1 tab PRN BID  PRN PO PAIN 

Last administered on 10/1/18at 10:10;  Start 9/22/18 at 00:15


Acetaminophen/ Hydrocodone Bitart (Lortab 5/325) 1 tab QID PO  Last 

administered on 10/1/18at 19:51;  Start 9/22/18 at 09:00


Insulin Aspart (Novolog Mix 70-30) 5 units TIDWMEALS SQ  Last administered on 9/ 24/18at 17:00;  Start 9/22/18 at 08:00;  Stop 9/25/18 at 11:59;  Status DC


Cetirizine HCl (ZyrTEC) 10 mg DAILY PO  Last administered on 10/1/18at 08:31;  

Start 9/22/18 at 09:00


Al Hydroxide/Mg Hydroxide (Mylanta Plus Xs) 30 ml PRN Q6HRS  PRN PO DYSPEPSIA;  

Start 9/22/18 at 00:15


Meclizine HCl (Antivert) 25 mg PRN TID  PRN PO DIZZINESS;  Start 9/22/18 at 00:

15


Metoprolol Succinate (Toprol Xl) 100 mg DAILY PO  Last administered on 10/1/

18at 08:30;  Start 9/22/18 at 09:00


Ondansetron HCl (Zofran Odt) 8 mg PRN TID  PRN PO NAUSEA/VOMITING;  Start 9/22/ 18 at 00:15


Polyethylene Glycol (miraLAX) 17 gm DAILY PO  Last administered on 10/1/18at 08:

29;  Start 9/22/18 at 09:00


Sennosides (Senna) 8.6 mg DAILY PO  Last administered on 10/1/18at 08:29;  

Start 9/22/18 at 09:00


Warfarin Sodium (Coumadin Per Pharmacy) 1 each PRN DAILY  PRN MC SEE COMMENTS 

Last administered on 9/29/18at 09:26;  Start 9/22/18 at 00:15


Warfarin Sodium (Coumadin) 1.5 mg DAILY PO ;  Start 9/22/18 at 09:00;  Status 

UNV


Simethicone (Gas-X) 80 mg PRN QID  PRN PO GAS / BLOATING;  Start 9/22/18 at 00:

15;  Status UNV


Non-Formulary Medication (Budesonide/ Formoterol Fumarate (Symbicort 160-4.5 

Mcg Inhaler)) 2 puff BID IH ;  Start 9/22/18 at 09:00;  Stop 9/22/18 at 09:00;  

Status DC


Losartan Potassium (Cozaar) 25 mg DAILY PO  Last administered on 10/1/18at 08:27

;  Start 9/22/18 at 09:00


Pantoprazole Sodium (Protonix) 40 mg DAILYAC PO  Last administered on 10/1/18at 

08:24;  Start 9/22/18 at 07:30


Non-Formulary Medication (Tiotropium Bromide (Spiriva Respimat)) 2.5 gm DAILY 

IH ;  Start 9/22/18 at 09:00;  Stop 9/22/18 at 09:00;  Status DC


Lorazepam (Ativan) 1 mg TID PO  Last administered on 9/26/18at 12:42;  Start 9/ 22/18 at 09:00;  Stop 9/26/18 at 18:03;  Status DC


Sertraline HCl (Zoloft) 50 mg DAILY PO  Last administered on 9/24/18at 08:04;  

Start 9/22/18 at 09:00;  Stop 9/24/18 at 09:00;  Status DC


Info (Anti-Coagulation Monitoring By Pharmacy) 1 each PRN DAILY  PRN MC SEE 

COMMENTS;  Start 9/22/18 at 09:00;  Status Cancel


Albuterol/ Ipratropium (Duoneb) 3 ml RTQID NEB  Last administered on 10/1/18at 

16:43;  Start 9/22/18 at 08:00


Budesonide (Pulmicort) 0.5 mg RTBID NEB  Last administered on 10/1/18at 10:27;  

Start 9/22/18 at 08:00


Warfarin Sodium (Coumadin) 1.5 mg 1X WARF  ONCE PO  Last administered on 9/22/ 18at 17:08;  Start 9/22/18 at 16:00;  Stop 9/22/18 at 16:01;  Status DC


Warfarin Sodium (Coumadin) 0.5 mg 1X WARF  ONCE PO ;  Start 9/22/18 at 16:00;  

Stop 9/22/18 at 16:00;  Status DC


Lactobacillus Rhamnosus (Culturelle) 1 cap BID PO  Last administered on 10/1/

18at 19:49;  Start 9/22/18 at 21:00


Carbidopa/ Levodopa/ Entacapone (Stalevo 100) 1 tab PRN BID  PRN PO TREMORS;  

Start 9/22/18 at 17:00;  Stop 9/22/18 at 17:03;  Status DC


Carbidopa/Levodopa (Sinemet 25/100) 1 tab PRN BID  PRN PO TREMORS Last 

administered on 10/1/18at 20:05;  Start 9/22/18 at 17:15


Melatonin 3 mg QHS PO  Last administered on 10/1/18at 19:48;  Start 9/22/18 at 

21:00


Warfarin Sodium (Coumadin) 1.5 mg DAILY16 PO  Last administered on 9/25/18at 17:

32;  Start 9/23/18 at 16:00;  Stop 9/26/18 at 10:49;  Status DC


Sertraline HCl (Zoloft) 75 mg DAILY PO  Last administered on 10/1/18at 08:31;  

Start 9/24/18 at 09:00


Insulin Human Isoph/Insulin Regular (HumuLIN 70/30) 5 units TIDWMEALS SQ ;  

Start 9/25/18 at 12:00;  Stop 9/25/18 at 13:53;  Status DC


Insulin Human Lispro (HumaLOG) 5 units TIDWMEALS SQ  Last administered on 10/1/

18at 17:27;  Start 9/25/18 at 17:00


Warfarin Sodium (Coumadin) 2 mg 1X WARF  ONCE PO  Last administered on 9/26/ 18at 17:29;  Start 9/26/18 at 16:00;  Stop 9/26/18 at 16:01;  Status DC


Lorazepam (Ativan) 1 mg HS PO  Last administered on 10/1/18at 19:49;  Start 9/26 /18 at 21:00;  Stop 10/3/18 at 01:00


Lorazepam (Ativan) 0.5 mg DAILY PO  Last administered on 9/29/18at 07:45;  

Start 9/27/18 at 09:00;  Stop 9/30/18 at 08:50;  Status DC


Lorazepam (Ativan) 0.5 mg BID92 PO  Last administered on 10/1/18at 13:44;  

Start 9/30/18 at 09:00;  Stop 10/3/18 at 01:00


Trazodone HCl (Desyrel) 50 mg QHS PO  Last administered on 10/1/18at 19:50;  

Start 9/27/18 at 21:00


Trazodone HCl (Desyrel) 50 mg PRN QHS  PRN PO INSOMNIA;  Start 9/27/18 at 11:45


Warfarin Sodium (Coumadin) 2 mg DAILY16 PO  Last administered on 10/1/18at 17:09

;  Start 9/27/18 at 16:00


Lorazepam (Ativan) 0.5 mg TID PO ;  Start 10/3/18 at 09:00





Active Scripts


Active


Reported


Omeprazole 20 Mg Capsule.dr 20 Mg PO DAILY


Claritin (Loratadine) 10 Mg Tablet 10 Mg PO DAILY


Ativan (Lorazepam) 1 Mg Tablet 1 Mg PO TID


Symbicort 160-4.5 Mcg Inhaler (Budesonide/Formoterol Fumarate) 10.2 Gm 

Hfa.aer.ad 2 Puff IH BID


Simethicone 80 Mg Tab.chew 80 Mg PO PRN QID PRN


Miralax (Polyethylene Glycol 3350) 17 Gm Powd.pack 17 Gm PO PRN DAILY PRN


Metoprolol Succinate ( Xl ) (Metoprolol Succinate) 100 Mg Tab.er.24h 100 Mg PO 

DAILY


Meclizine Hcl 25 Mg Tablet 25 Mg PO PRN TID PRN


Duoneb 0.5-3(2.5) Mg/3 Ml (Albuterol/Ipratropium) 3 Ml Ampul.neb 3 Ml NEB QID 

PRN


Artificial Tears Eye Drops (Dextran 70/Hypromellose) 15 Ml Drops 1 Drop EACHEYE 

QID PRN


Spiriva Respimat (Tiotropium Bromide) 4 Gm Mist.inhal 2.5 Gm IH DAILY


Vitamin C (Ascorbate Calcium) 500 Mg Tablet 500 Mg PO BID


Amantadine (Amantadine Hcl) 100 Mg Tablet 200 Mg PO BID


Tums (Calcium Carbonate) 200 Mg Tab.chew 400 Mg PO PRN TID PRN


Hiprex (Methenamine Hippurate) 1 Gm Tablet 1 Gm PO BID


Coumadin (Warfarin Sodium) 1 Mg Tablet 0.5 Mg PO DAILY


Coumadin (Warfarin Sodium) 1 Mg Tablet 1 Mg PO DAILY


Norco 5-325 Tablet (Hydrocodone Bit/Acetaminophen) 1 Each Tablet 1 Tab PO QID


Norco 5-325 Tablet (Hydrocodone Bit/Acetaminophen) 1 Each Tablet 1 Tab PO PRN 

BID PRN


Senna (Sennosides) 8.6 Mg Tablet 8.6 Mg PO DAILY


Dayana-Lanta Liquid (Mag Hydrox/Al Hydrox/Simeth) 355 Ml Oral.susp 30 Ml PO PRN 

Q6HRS PRN


Zofran (Ondansetron Hcl) 8 Mg Tablet 8 Mg PO PRN TID PRN


Zoloft (Sertraline Hcl) 50 Mg Tablet 50 Mg PO DAILY


Novolog (Insulin Aspart) 100 Unit/1 Ml Cartridge 5 Unit SQ TIDWMEALS


Robitussin Cough-Chest Dm Liq (Guaifenesin/Dextromethorphan) 237 Ml Liquid 5 Ml 

PO PRN Q4HRS PRN


Carbidopa-Levo  Mg Odt (Carbidopa/Levodopa) 1 Each Tab.rapdis 1 Tab PO QID


Simethicone 80 Mg Tab.chew 80 Mg PO PRN QID PRN


Lantus Solostar (Insulin Glargine,Hum.rec.anlog) 100 Unit/1 Ml Insuln.pen 26 

Unit SQ HS


Amitiza (Lubiprostone) 24 Mcg Capsule 24 Mcg PO DAILY


Klor-Con M20 (Potassium Chloride) 20 Meq Tab.er.prt 20 Meq PO DAILY


Benicar (Olmesartan Medoxomil) 5 Mg Tablet 5 Mg PO DAILY


Allopurinol 300 Mg Tablet 300 Mg PO DAILY


I have reviewed the current psychotropics carefully including drug 

interactions.  Risk benefit ratio favors no change other than as noted in my 

dictated progress note.





Diagnosis:


Problems:  


(1) Dementia


(2) UTI (urinary tract infection)


(3) MDD (major depressive disorder)


(4) Anxiety disorder


(5) Mild cognitive impairment











JULIANA CARDONA MD Oct 1, 2018 20:14

## 2018-10-02 VITALS — SYSTOLIC BLOOD PRESSURE: 144 MMHG | DIASTOLIC BLOOD PRESSURE: 71 MMHG

## 2018-10-02 VITALS — DIASTOLIC BLOOD PRESSURE: 76 MMHG | SYSTOLIC BLOOD PRESSURE: 127 MMHG

## 2018-10-02 RX ADMIN — OXYCODONE HYDROCHLORIDE AND ACETAMINOPHEN SCH MG: 500 TABLET ORAL at 08:08

## 2018-10-02 RX ADMIN — METHENAMINE HIPPURATE SCH GM: 1 TABLET ORAL at 08:10

## 2018-10-02 RX ADMIN — SERTRALINE HYDROCHLORIDE SCH MG: 50 TABLET ORAL at 08:14

## 2018-10-02 RX ADMIN — LUBIPROSTONE SCH MCG: 24 CAPSULE, GELATIN COATED ORAL at 08:11

## 2018-10-02 RX ADMIN — HYDROCODONE BITARTRATE AND ACETAMINOPHEN SCH TAB: 5; 325 TABLET ORAL at 13:53

## 2018-10-02 RX ADMIN — INSULIN GLARGINE SCH UNITS: 100 INJECTION, SOLUTION SUBCUTANEOUS at 19:40

## 2018-10-02 RX ADMIN — WARFARIN SODIUM SCH MG: 2 TABLET ORAL at 17:27

## 2018-10-02 RX ADMIN — CARBIDOPA AND LEVODOPA SCH TAB: 25; 250 TABLET ORAL at 00:05

## 2018-10-02 RX ADMIN — INSULIN LISPRO SCH UNITS: 100 INJECTION, SOLUTION INTRAVENOUS; SUBCUTANEOUS at 12:25

## 2018-10-02 RX ADMIN — IPRATROPIUM BROMIDE AND ALBUTEROL SULFATE SCH ML: .5; 3 SOLUTION RESPIRATORY (INHALATION) at 21:11

## 2018-10-02 RX ADMIN — METHENAMINE HIPPURATE SCH GM: 1 TABLET ORAL at 19:35

## 2018-10-02 RX ADMIN — CARBIDOPA AND LEVODOPA PRN TAB: 25; 100 TABLET ORAL at 19:37

## 2018-10-02 RX ADMIN — IPRATROPIUM BROMIDE AND ALBUTEROL SULFATE SCH ML: .5; 3 SOLUTION RESPIRATORY (INHALATION) at 09:40

## 2018-10-02 RX ADMIN — PANTOPRAZOLE SODIUM SCH MG: 40 TABLET, DELAYED RELEASE ORAL at 08:08

## 2018-10-02 RX ADMIN — OXYCODONE HYDROCHLORIDE AND ACETAMINOPHEN SCH MG: 500 TABLET ORAL at 19:37

## 2018-10-02 RX ADMIN — SENNOSIDES A AND B SCH MG: 8.6 TABLET, FILM COATED ORAL at 08:08

## 2018-10-02 RX ADMIN — INSULIN LISPRO SCH UNITS: 100 INJECTION, SOLUTION INTRAVENOUS; SUBCUTANEOUS at 08:00

## 2018-10-02 RX ADMIN — HYDROCODONE BITARTRATE AND ACETAMINOPHEN SCH TAB: 5; 325 TABLET ORAL at 17:30

## 2018-10-02 RX ADMIN — CARBIDOPA AND LEVODOPA SCH TAB: 25; 250 TABLET ORAL at 17:30

## 2018-10-02 RX ADMIN — CARBIDOPA AND LEVODOPA SCH TAB: 25; 250 TABLET ORAL at 05:23

## 2018-10-02 RX ADMIN — IPRATROPIUM BROMIDE AND ALBUTEROL SULFATE SCH ML: .5; 3 SOLUTION RESPIRATORY (INHALATION) at 15:23

## 2018-10-02 RX ADMIN — BUDESONIDE SCH MG: 0.5 SUSPENSION RESPIRATORY (INHALATION) at 09:40

## 2018-10-02 RX ADMIN — AMANTADINE HYDROCHLORIDE SCH MG: 100 CAPSULE ORAL at 19:39

## 2018-10-02 RX ADMIN — BUDESONIDE SCH MG: 0.5 SUSPENSION RESPIRATORY (INHALATION) at 21:11

## 2018-10-02 RX ADMIN — METOPROLOL SUCCINATE SCH MG: 50 TABLET, EXTENDED RELEASE ORAL at 08:08

## 2018-10-02 RX ADMIN — HYDROCODONE BITARTRATE AND ACETAMINOPHEN SCH TAB: 5; 325 TABLET ORAL at 19:38

## 2018-10-02 RX ADMIN — TRAZODONE HYDROCHLORIDE SCH MG: 50 TABLET, FILM COATED ORAL at 19:36

## 2018-10-02 RX ADMIN — IPRATROPIUM BROMIDE AND ALBUTEROL SULFATE SCH ML: .5; 3 SOLUTION RESPIRATORY (INHALATION) at 05:28

## 2018-10-02 RX ADMIN — LOSARTAN POTASSIUM SCH MG: 25 TABLET, FILM COATED ORAL at 08:07

## 2018-10-02 RX ADMIN — ALLOPURINOL SCH MG: 300 TABLET ORAL at 08:07

## 2018-10-02 RX ADMIN — POLYETHYLENE GLYCOL 3350 SCH GM: 17 POWDER, FOR SOLUTION ORAL at 08:08

## 2018-10-02 RX ADMIN — POTASSIUM CHLORIDE SCH MEQ: 1500 TABLET, EXTENDED RELEASE ORAL at 08:07

## 2018-10-02 RX ADMIN — MELATONIN TAB 3 MG SCH MG: 3 TAB at 19:37

## 2018-10-02 RX ADMIN — CARBIDOPA AND LEVODOPA SCH TAB: 25; 250 TABLET ORAL at 12:23

## 2018-10-02 RX ADMIN — Medication SCH CAP: at 19:37

## 2018-10-02 RX ADMIN — AMANTADINE HYDROCHLORIDE SCH MG: 100 CAPSULE ORAL at 08:10

## 2018-10-02 RX ADMIN — Medication SCH CAP: at 08:08

## 2018-10-02 RX ADMIN — INSULIN LISPRO SCH UNITS: 100 INJECTION, SOLUTION INTRAVENOUS; SUBCUTANEOUS at 17:31

## 2018-10-02 RX ADMIN — CETIRIZINE HYDROCHLORIDE SCH MG: 10 TABLET, FILM COATED ORAL at 08:08

## 2018-10-02 RX ADMIN — CARBIDOPA AND LEVODOPA PRN TAB: 25; 100 TABLET ORAL at 08:51

## 2018-10-02 RX ADMIN — HYDROCODONE BITARTRATE AND ACETAMINOPHEN SCH TAB: 5; 325 TABLET ORAL at 08:14

## 2018-10-02 NOTE — PDOC
Exam


Note:


[facility] Note:


Please also refer to the separate dictated note~for this date of service 

dictated separately.~Patient seen individually. Discussed the patient with 

Nursing staff reviewed the chart.~Reviewed interim history and current 

functioning. Reviewed vital signs,~Labs/ Radiology~and current medications 

noted below. Continue current treatment with the changes noted in the dictated 

addendum note





Assessment:


Vital Signs:





 Vital Signs








  Date Time  Temp Pulse Resp B/P (MAP) Pulse Ox O2 Delivery O2 Flow Rate FiO2


 


10/2/18 19:38     93 Room Air  


 


10/2/18 19:12   18     


 


10/2/18 15:46 98.0 71  127/76 (93)    








I&O











Intake and Output 


 


 10/2/18





 07:00


 


Intake Total 1200 ml


 


Balance 1200 ml


 


 


 


Intake Oral 1080 ml


 


Tube Feeding 120 ml


 


# Bowel Movements 2








Labs:





 Laboratory Tests








Test


 10/2/18


07:37 10/2/18


12:16 10/2/18


16:22 10/2/18


19:30


 


Glucose (Fingerstick)


 77 mg/dL


(70-99) 211 mg/dL


(70-99)  H 159 mg/dL


(70-99)  H 93 mg/dL


(70-99)











Current Medications:


Meds:





Current Medications


Cephalexin HCl (Keflex) 500 mg 1X  ONCE PO  Last administered on 9/21/18at 23:21

;  Start 9/21/18 at 23:15;  Stop 9/21/18 at 23:16;  Status DC


Insulin Human Lispro (HumaLOG) 0-5 UNITS TIDWMEALS SQ  Last administered on 9/25 /18at 11:54;  Start 9/22/18 at 08:00;  Stop 9/25/18 at 13:54;  Status DC


Dextrose 12.5 gm PRN Q15MIN  PRN IV SEE COMMENTS;  Start 9/21/18 at 23:00


Cephalexin HCl (Keflex) 500 mg TID PO  Last administered on 9/28/18at 19:03;  

Start 9/22/18 at 09:00;  Stop 9/29/18 at 08:59;  Status DC


Lorazepam (Ativan) 0.5 mg 1X  ONCE PO  Last administered on 9/21/18at 23:21;  

Start 9/21/18 at 23:15;  Stop 9/21/18 at 23:16;  Status DC


Famotidine (Pepcid) 20 mg 1X  ONCE PO  Last administered on 9/21/18at 23:21;  

Start 9/21/18 at 23:15;  Stop 9/21/18 at 23:16;  Status DC


Acetaminophen (Tylenol) 650 mg PRN Q6HRS  PRN PO PAIN / TEMP;  Start 9/22/18 at 

00:15


Multi-Ingredient Ointment (Analgesic Balm) 1 mami PRN QID  PRN TP MUSCLE PAIN;  

Start 9/22/18 at 00:15


Magnesium Hydroxide (Milk Of Magnesia) 2,400 mg PRN QHS  PRN PO CONSTIPATION 

Last administered on 9/28/18at 19:26;  Start 9/22/18 at 00:15


Calcium Carbonate/ Glycine (Tums) 400 mg PRN TID  PRN PO INDIGESTION;  Start 9/ 22/18 at 00:15


Insulin Glargine (Lantus) 26 units HS SQ  Last administered on 10/1/18at 19:52;

  Start 9/22/18 at 21:00


Albuterol Sulfate (Ventolin) 2.5 mg PRN QID  PRN NEB SHORTNESS OF BREATH;  

Start 9/22/18 at 00:15


Lubiprostone (Amitiza) 24 mcg DAILY PO  Last administered on 10/2/18at 08:11;  

Start 9/22/18 at 09:00


Methenamine Hippurate (Hiprex) 1 gm BID PO  Last administered on 10/2/18at 19:35

;  Start 9/22/18 at 09:00


Potassium Chloride (Klor-Con) 20 meq DAILY PO  Last administered on 10/2/18at 08

:07;  Start 9/22/18 at 09:00


Simethicone (Gas-X) 80 mg PRN QID  PRN PO GAS / BLOATING;  Start 9/22/18 at 00:

15


Allopurinol (Zyloprim) 300 mg DAILY PO  Last administered on 10/2/18at 08:07;  

Start 9/22/18 at 09:00


Amantadine HCl (Symmetrel) 200 mg BID PO  Last administered on 10/2/18at 19:39;

  Start 9/22/18 at 09:00


Ascorbic Acid (Vitamin C) 500 mg BID PO  Last administered on 10/2/18at 19:37;  

Start 9/22/18 at 09:00


Carbidopa/Levodopa (Sinemet 25/250) 1 tab [id number] PO  Last administered on 10/

2/18at 17:30;  Start 9/22/18 at 06:00


Artificial Tears (Artificial Tears) 1 drop PRN Q6HRS  PRN OU DRY EYE;  Start 9/ 22/18 at 00:15


Guaifenesin (Robitussin Dm) 5 ml PRN Q4HRS  PRN PO COUGH;  Start 9/22/18 at 00:

15


Acetaminophen/ Hydrocodone Bitart (Lortab 5/325) 1 tab PRN BID  PRN PO PAIN 

Last administered on 10/1/18at 10:10;  Start 9/22/18 at 00:15


Acetaminophen/ Hydrocodone Bitart (Lortab 5/325) 1 tab QID PO  Last 

administered on 10/2/18at 19:38;  Start 9/22/18 at 09:00


Insulin Aspart (Novolog Mix 70-30) 5 units TIDWMEALS SQ  Last administered on 9/ 24/18at 17:00;  Start 9/22/18 at 08:00;  Stop 9/25/18 at 11:59;  Status DC


Cetirizine HCl (ZyrTEC) 10 mg DAILY PO  Last administered on 10/2/18at 08:08;  

Start 9/22/18 at 09:00


Al Hydroxide/Mg Hydroxide (Mylanta Plus Xs) 30 ml PRN Q6HRS  PRN PO DYSPEPSIA;  

Start 9/22/18 at 00:15


Meclizine HCl (Antivert) 25 mg PRN TID  PRN PO DIZZINESS;  Start 9/22/18 at 00:

15


Metoprolol Succinate (Toprol Xl) 100 mg DAILY PO  Last administered on 10/2/

18at 08:08;  Start 9/22/18 at 09:00


Ondansetron HCl (Zofran Odt) 8 mg PRN TID  PRN PO NAUSEA/VOMITING;  Start 9/22/ 18 at 00:15


Polyethylene Glycol (miraLAX) 17 gm DAILY PO  Last administered on 10/2/18at 08:

08;  Start 9/22/18 at 09:00


Sennosides (Senna) 8.6 mg DAILY PO  Last administered on 10/2/18at 08:08;  

Start 9/22/18 at 09:00


Warfarin Sodium (Coumadin Per Pharmacy) 1 each PRN DAILY  PRN MC SEE COMMENTS 

Last administered on 9/29/18at 09:26;  Start 9/22/18 at 00:15


Warfarin Sodium (Coumadin) 1.5 mg DAILY PO ;  Start 9/22/18 at 09:00;  Status 

UNV


Simethicone (Gas-X) 80 mg PRN QID  PRN PO GAS / BLOATING;  Start 9/22/18 at 00:

15;  Status UNV


Non-Formulary Medication (Budesonide/ Formoterol Fumarate (Symbicort 160-4.5 

Mcg Inhaler)) 2 puff BID IH ;  Start 9/22/18 at 09:00;  Stop 9/22/18 at 09:00;  

Status DC


Losartan Potassium (Cozaar) 25 mg DAILY PO  Last administered on 10/2/18at 08:07

;  Start 9/22/18 at 09:00


Pantoprazole Sodium (Protonix) 40 mg DAILYAC PO  Last administered on 10/2/18at 

08:08;  Start 9/22/18 at 07:30


Non-Formulary Medication (Tiotropium Bromide (Spiriva Respimat)) 2.5 gm DAILY 

IH ;  Start 9/22/18 at 09:00;  Stop 9/22/18 at 09:00;  Status DC


Lorazepam (Ativan) 1 mg TID PO  Last administered on 9/26/18at 12:42;  Start 9/ 22/18 at 09:00;  Stop 9/26/18 at 18:03;  Status DC


Sertraline HCl (Zoloft) 50 mg DAILY PO  Last administered on 9/24/18at 08:04;  

Start 9/22/18 at 09:00;  Stop 9/24/18 at 09:00;  Status DC


Info (Anti-Coagulation Monitoring By Pharmacy) 1 each PRN DAILY  PRN MC SEE 

COMMENTS;  Start 9/22/18 at 09:00;  Status Cancel


Albuterol/ Ipratropium (Duoneb) 3 ml RTQID NEB  Last administered on 10/2/18at 

15:23;  Start 9/22/18 at 08:00


Budesonide (Pulmicort) 0.5 mg RTBID NEB  Last administered on 10/2/18at 09:40;  

Start 9/22/18 at 08:00


Warfarin Sodium (Coumadin) 1.5 mg 1X WARF  ONCE PO  Last administered on 9/22/

18at 17:08;  Start 9/22/18 at 16:00;  Stop 9/22/18 at 16:01;  Status DC


Warfarin Sodium (Coumadin) 0.5 mg 1X WARF  ONCE PO ;  Start 9/22/18 at 16:00;  

Stop 9/22/18 at 16:00;  Status DC


Lactobacillus Rhamnosus (Culturelle) 1 cap BID PO  Last administered on 10/2/

18at 19:37;  Start 9/22/18 at 21:00


Carbidopa/ Levodopa/ Entacapone (Stalevo 100) 1 tab PRN BID  PRN PO TREMORS;  

Start 9/22/18 at 17:00;  Stop 9/22/18 at 17:03;  Status DC


Carbidopa/Levodopa (Sinemet 25/100) 1 tab PRN BID  PRN PO TREMORS Last 

administered on 10/2/18at 19:37;  Start 9/22/18 at 17:15


Melatonin 3 mg QHS PO  Last administered on 10/2/18at 19:37;  Start 9/22/18 at 

21:00


Warfarin Sodium (Coumadin) 1.5 mg DAILY16 PO  Last administered on 9/25/18at 17:

32;  Start 9/23/18 at 16:00;  Stop 9/26/18 at 10:49;  Status DC


Sertraline HCl (Zoloft) 75 mg DAILY PO  Last administered on 10/2/18at 08:14;  

Start 9/24/18 at 09:00;  Stop 10/2/18 at 19:22;  Status DC


Insulin Human Isoph/Insulin Regular (HumuLIN 70/30) 5 units TIDWMEALS SQ ;  

Start 9/25/18 at 12:00;  Stop 9/25/18 at 13:53;  Status DC


Insulin Human Lispro (HumaLOG) 5 units TIDWMEALS SQ  Last administered on 10/2/

18at 17:31;  Start 9/25/18 at 17:00


Warfarin Sodium (Coumadin) 2 mg 1X WARF  ONCE PO  Last administered on 9/26/ 18at 17:29;  Start 9/26/18 at 16:00;  Stop 9/26/18 at 16:01;  Status DC


Lorazepam (Ativan) 1 mg HS PO  Last administered on 10/2/18at 19:37;  Start 9/26 /18 at 21:00;  Stop 10/3/18 at 01:00


Lorazepam (Ativan) 0.5 mg DAILY PO  Last administered on 9/29/18at 07:45;  

Start 9/27/18 at 09:00;  Stop 9/30/18 at 08:50;  Status DC


Lorazepam (Ativan) 0.5 mg BID92 PO  Last administered on 10/2/18at 13:53;  

Start 9/30/18 at 09:00;  Stop 10/3/18 at 01:00


Trazodone HCl (Desyrel) 50 mg QHS PO  Last administered on 10/2/18at 19:36;  

Start 9/27/18 at 21:00


Trazodone HCl (Desyrel) 50 mg PRN QHS  PRN PO INSOMNIA;  Start 9/27/18 at 11:45


Warfarin Sodium (Coumadin) 2 mg DAILY16 PO  Last administered on 10/2/18at 17:27

;  Start 9/27/18 at 16:00


Lorazepam (Ativan) 0.5 mg TID PO ;  Start 10/3/18 at 09:00


Sertraline HCl (Zoloft) 100 mg DAILY PO ;  Start 10/3/18 at 09:00





Active Scripts


Active


Reported


Omeprazole 20 Mg Capsule.dr 20 Mg PO DAILY


Claritin (Loratadine) 10 Mg Tablet 10 Mg PO DAILY


Ativan (Lorazepam) 1 Mg Tablet 1 Mg PO TID


Symbicort 160-4.5 Mcg Inhaler (Budesonide/Formoterol Fumarate) 10.2 Gm 

Hfa.aer.ad 2 Puff IH BID


Simethicone 80 Mg Tab.chew 80 Mg PO PRN QID PRN


Miralax (Polyethylene Glycol 3350) 17 Gm Powd.pack 17 Gm PO PRN DAILY PRN


Metoprolol Succinate ( Xl ) (Metoprolol Succinate) 100 Mg Tab.er.24h 100 Mg PO 

DAILY


Meclizine Hcl 25 Mg Tablet 25 Mg PO PRN TID PRN


Duoneb 0.5-3(2.5) Mg/3 Ml (Albuterol/Ipratropium) 3 Ml Ampul.neb 3 Ml NEB QID 

PRN


Artificial Tears Eye Drops (Dextran 70/Hypromellose) 15 Ml Drops 1 Drop EACHEYE 

QID PRN


Spiriva Respimat (Tiotropium Bromide) 4 Gm Mist.inhal 2.5 Gm IH DAILY


Vitamin C (Ascorbate Calcium) 500 Mg Tablet 500 Mg PO BID


Amantadine (Amantadine Hcl) 100 Mg Tablet 200 Mg PO BID


Tums (Calcium Carbonate) 200 Mg Tab.chew 400 Mg PO PRN TID PRN


Hiprex (Methenamine Hippurate) 1 Gm Tablet 1 Gm PO BID


Coumadin (Warfarin Sodium) 1 Mg Tablet 0.5 Mg PO DAILY


Coumadin (Warfarin Sodium) 1 Mg Tablet 1 Mg PO DAILY


Norco 5-325 Tablet (Hydrocodone Bit/Acetaminophen) 1 Each Tablet 1 Tab PO QID


Norco 5-325 Tablet (Hydrocodone Bit/Acetaminophen) 1 Each Tablet 1 Tab PO PRN 

BID PRN


Senna (Sennosides) 8.6 Mg Tablet 8.6 Mg PO DAILY


Dayana-Lanta Liquid (Mag Hydrox/Al Hydrox/Simeth) 355 Ml Oral.susp 30 Ml PO PRN 

Q6HRS PRN


Zofran (Ondansetron Hcl) 8 Mg Tablet 8 Mg PO PRN TID PRN


Zoloft (Sertraline Hcl) 50 Mg Tablet 50 Mg PO DAILY


Novolog (Insulin Aspart) 100 Unit/1 Ml Cartridge 5 Unit SQ TIDWMEALS


Robitussin Cough-Chest Dm Liq (Guaifenesin/Dextromethorphan) 237 Ml Liquid 5 Ml 

PO PRN Q4HRS PRN


Carbidopa-Levo  Mg Odt (Carbidopa/Levodopa) 1 Each Tab.rapdis 1 Tab PO QID


Simethicone 80 Mg Tab.chew 80 Mg PO PRN QID PRN


Lantus Solostar (Insulin Glargine,Hum.rec.anlog) 100 Unit/1 Ml Insuln.pen 26 

Unit SQ HS


Amitiza (Lubiprostone) 24 Mcg Capsule 24 Mcg PO DAILY


Klor-Con M20 (Potassium Chloride) 20 Meq Tab.er.prt 20 Meq PO DAILY


Benicar (Olmesartan Medoxomil) 5 Mg Tablet 5 Mg PO DAILY


Allopurinol 300 Mg Tablet 300 Mg PO DAILY


I have reviewed the current psychotropics carefully including drug 

interactions.  Risk benefit ratio favors no change other than as noted in my 

dictated progress note.





Diagnosis:


Problems:  


(1) Dementia


(2) UTI (urinary tract infection)


(3) MDD (major depressive disorder)


(4) Anxiety disorder


(5) Mild cognitive impairment











JULIANA CARDONA MD Oct 2, 2018 20:38

## 2018-10-03 VITALS — DIASTOLIC BLOOD PRESSURE: 71 MMHG | SYSTOLIC BLOOD PRESSURE: 147 MMHG

## 2018-10-03 VITALS — SYSTOLIC BLOOD PRESSURE: 134 MMHG | DIASTOLIC BLOOD PRESSURE: 78 MMHG

## 2018-10-03 RX ADMIN — POTASSIUM CHLORIDE SCH MEQ: 1500 TABLET, EXTENDED RELEASE ORAL at 08:06

## 2018-10-03 RX ADMIN — CARBIDOPA AND LEVODOPA SCH TAB: 25; 250 TABLET ORAL at 05:53

## 2018-10-03 RX ADMIN — BUDESONIDE SCH MG: 0.5 SUSPENSION RESPIRATORY (INHALATION) at 09:30

## 2018-10-03 RX ADMIN — CARBIDOPA AND LEVODOPA SCH TAB: 25; 250 TABLET ORAL at 17:20

## 2018-10-03 RX ADMIN — IPRATROPIUM BROMIDE AND ALBUTEROL SULFATE SCH ML: .5; 3 SOLUTION RESPIRATORY (INHALATION) at 05:09

## 2018-10-03 RX ADMIN — HYDROCODONE BITARTRATE AND ACETAMINOPHEN SCH TAB: 5; 325 TABLET ORAL at 17:21

## 2018-10-03 RX ADMIN — Medication SCH CAP: at 08:07

## 2018-10-03 RX ADMIN — AMANTADINE HYDROCHLORIDE SCH MG: 100 CAPSULE ORAL at 20:26

## 2018-10-03 RX ADMIN — POLYETHYLENE GLYCOL 3350 SCH GM: 17 POWDER, FOR SOLUTION ORAL at 08:07

## 2018-10-03 RX ADMIN — TRAZODONE HYDROCHLORIDE SCH MG: 50 TABLET, FILM COATED ORAL at 20:25

## 2018-10-03 RX ADMIN — INSULIN LISPRO SCH UNITS: 100 INJECTION, SOLUTION INTRAVENOUS; SUBCUTANEOUS at 12:13

## 2018-10-03 RX ADMIN — HYDROCODONE BITARTRATE AND ACETAMINOPHEN SCH TAB: 5; 325 TABLET ORAL at 13:31

## 2018-10-03 RX ADMIN — SERTRALINE HYDROCHLORIDE SCH MG: 100 TABLET ORAL at 08:13

## 2018-10-03 RX ADMIN — AMANTADINE HYDROCHLORIDE SCH MG: 100 CAPSULE ORAL at 08:08

## 2018-10-03 RX ADMIN — METHENAMINE HIPPURATE SCH GM: 1 TABLET ORAL at 20:26

## 2018-10-03 RX ADMIN — SENNOSIDES A AND B SCH MG: 8.6 TABLET, FILM COATED ORAL at 08:07

## 2018-10-03 RX ADMIN — LUBIPROSTONE SCH MCG: 24 CAPSULE, GELATIN COATED ORAL at 08:08

## 2018-10-03 RX ADMIN — OXYCODONE HYDROCHLORIDE AND ACETAMINOPHEN SCH MG: 500 TABLET ORAL at 20:25

## 2018-10-03 RX ADMIN — CARBIDOPA AND LEVODOPA SCH TAB: 25; 250 TABLET ORAL at 01:54

## 2018-10-03 RX ADMIN — INSULIN LISPRO SCH UNITS: 100 INJECTION, SOLUTION INTRAVENOUS; SUBCUTANEOUS at 17:24

## 2018-10-03 RX ADMIN — HYDROCODONE BITARTRATE AND ACETAMINOPHEN SCH TAB: 5; 325 TABLET ORAL at 20:25

## 2018-10-03 RX ADMIN — LOSARTAN POTASSIUM SCH MG: 25 TABLET, FILM COATED ORAL at 08:07

## 2018-10-03 RX ADMIN — IPRATROPIUM BROMIDE AND ALBUTEROL SULFATE SCH ML: .5; 3 SOLUTION RESPIRATORY (INHALATION) at 20:36

## 2018-10-03 RX ADMIN — MELATONIN TAB 3 MG SCH MG: 3 TAB at 20:26

## 2018-10-03 RX ADMIN — BUDESONIDE SCH MG: 0.5 SUSPENSION RESPIRATORY (INHALATION) at 20:36

## 2018-10-03 RX ADMIN — CETIRIZINE HYDROCHLORIDE SCH MG: 10 TABLET, FILM COATED ORAL at 08:07

## 2018-10-03 RX ADMIN — INSULIN LISPRO SCH UNITS: 100 INJECTION, SOLUTION INTRAVENOUS; SUBCUTANEOUS at 08:00

## 2018-10-03 RX ADMIN — IPRATROPIUM BROMIDE AND ALBUTEROL SULFATE SCH ML: .5; 3 SOLUTION RESPIRATORY (INHALATION) at 09:30

## 2018-10-03 RX ADMIN — METHENAMINE HIPPURATE SCH GM: 1 TABLET ORAL at 08:08

## 2018-10-03 RX ADMIN — INSULIN GLARGINE SCH UNITS: 100 INJECTION, SOLUTION SUBCUTANEOUS at 20:27

## 2018-10-03 RX ADMIN — Medication PRN EACH: at 15:29

## 2018-10-03 RX ADMIN — PANTOPRAZOLE SODIUM SCH MG: 40 TABLET, DELAYED RELEASE ORAL at 08:07

## 2018-10-03 RX ADMIN — METOPROLOL SUCCINATE SCH MG: 50 TABLET, EXTENDED RELEASE ORAL at 08:14

## 2018-10-03 RX ADMIN — ALLOPURINOL SCH MG: 300 TABLET ORAL at 08:07

## 2018-10-03 RX ADMIN — CARBIDOPA AND LEVODOPA SCH TAB: 25; 250 TABLET ORAL at 12:06

## 2018-10-03 RX ADMIN — Medication SCH CAP: at 20:25

## 2018-10-03 RX ADMIN — HYDROCODONE BITARTRATE AND ACETAMINOPHEN SCH TAB: 5; 325 TABLET ORAL at 08:13

## 2018-10-03 RX ADMIN — OXYCODONE HYDROCHLORIDE AND ACETAMINOPHEN SCH MG: 500 TABLET ORAL at 08:07

## 2018-10-03 RX ADMIN — IPRATROPIUM BROMIDE AND ALBUTEROL SULFATE SCH ML: .5; 3 SOLUTION RESPIRATORY (INHALATION) at 15:46

## 2018-10-03 NOTE — PDOC
Exam


Note:


[facility] Note:


Please also refer to the separate dictated note~for this date of service 

dictated separately.~Patient seen individually. Discussed the patient with 

Nursing staff reviewed the chart.~Reviewed interim history and current 

functioning. Reviewed vital signs,~Labs/ Radiology~and current medications 

noted below. Continue current treatment with the changes noted in the dictated 

addendum note





Assessment:


Vital Signs:





 Vital Signs








  Date Time  Temp Pulse Resp B/P (MAP) Pulse Ox O2 Delivery O2 Flow Rate FiO2


 


10/3/18 20:25     94 Room Air  


 


10/3/18 17:21   18     


 


10/3/18 16:22 97.8 68  134/78 (96)    








I&O











Intake and Output 


 


 10/3/18





 07:00


 


Intake Total 1320 ml


 


Balance 1320 ml


 


 


 


Intake Oral 1320 ml








Labs:





 Laboratory Tests








Test


 10/3/18


06:45 10/3/18


07:27 10/3/18


11:49 10/3/18


16:37


 


Prothrombin Time


 27.1 SEC


(9.4-11.4)  H 


 


 





 


Prothrombin Time INR


 2.9 (0.9-1.1)


H 


 


 





 


Glucose (Fingerstick)


 


 102 mg/dL


(70-99)  H 144 mg/dL


(70-99)  H 150 mg/dL


(70-99)  H


 


Test


 10/3/18


19:16 


 


 





 


Glucose (Fingerstick)


 162 mg/dL


(70-99)  H 


 


 














Current Medications:


Meds:





Current Medications


Cephalexin HCl (Keflex) 500 mg 1X  ONCE PO  Last administered on 9/21/18at 23:21

;  Start 9/21/18 at 23:15;  Stop 9/21/18 at 23:16;  Status DC


Insulin Human Lispro (HumaLOG) 0-5 UNITS TIDWMEALS SQ  Last administered on 9/25 /18at 11:54;  Start 9/22/18 at 08:00;  Stop 9/25/18 at 13:54;  Status DC


Dextrose 12.5 gm PRN Q15MIN  PRN IV SEE COMMENTS;  Start 9/21/18 at 23:00


Cephalexin HCl (Keflex) 500 mg TID PO  Last administered on 9/28/18at 19:03;  

Start 9/22/18 at 09:00;  Stop 9/29/18 at 08:59;  Status DC


Lorazepam (Ativan) 0.5 mg 1X  ONCE PO  Last administered on 9/21/18at 23:21;  

Start 9/21/18 at 23:15;  Stop 9/21/18 at 23:16;  Status DC


Famotidine (Pepcid) 20 mg 1X  ONCE PO  Last administered on 9/21/18at 23:21;  

Start 9/21/18 at 23:15;  Stop 9/21/18 at 23:16;  Status DC


Acetaminophen (Tylenol) 650 mg PRN Q6HRS  PRN PO PAIN / TEMP;  Start 9/22/18 at 

00:15


Multi-Ingredient Ointment (Analgesic Balm) 1 mami PRN QID  PRN TP MUSCLE PAIN;  

Start 9/22/18 at 00:15


Magnesium Hydroxide (Milk Of Magnesia) 2,400 mg PRN QHS  PRN PO CONSTIPATION 

Last administered on 9/28/18at 19:26;  Start 9/22/18 at 00:15


Calcium Carbonate/ Glycine (Tums) 400 mg PRN TID  PRN PO INDIGESTION;  Start 9/ 22/18 at 00:15


Insulin Glargine (Lantus) 26 units HS SQ  Last administered on 10/3/18at 20:27;

  Start 9/22/18 at 21:00


Albuterol Sulfate (Ventolin) 2.5 mg PRN QID  PRN NEB SHORTNESS OF BREATH;  

Start 9/22/18 at 00:15


Lubiprostone (Amitiza) 24 mcg DAILY PO  Last administered on 10/3/18at 08:08;  

Start 9/22/18 at 09:00


Methenamine Hippurate (Hiprex) 1 gm BID PO  Last administered on 10/3/18at 20:26

;  Start 9/22/18 at 09:00


Potassium Chloride (Klor-Con) 20 meq DAILY PO  Last administered on 10/3/18at 08

:06;  Start 9/22/18 at 09:00


Simethicone (Gas-X) 80 mg PRN QID  PRN PO GAS / BLOATING;  Start 9/22/18 at 00:

15


Allopurinol (Zyloprim) 300 mg DAILY PO  Last administered on 10/3/18at 08:07;  

Start 9/22/18 at 09:00


Amantadine HCl (Symmetrel) 200 mg BID PO  Last administered on 10/3/18at 20:26;

  Start 9/22/18 at 09:00


Ascorbic Acid (Vitamin C) 500 mg BID PO  Last administered on 10/3/18at 20:25;  

Start 9/22/18 at 09:00


Carbidopa/Levodopa (Sinemet 25/250) 1 tab [id number] PO  Last administered on 10/

3/18at 17:20;  Start 9/22/18 at 06:00


Artificial Tears (Artificial Tears) 1 drop PRN Q6HRS  PRN OU DRY EYE;  Start 9/ 22/18 at 00:15


Guaifenesin (Robitussin Dm) 5 ml PRN Q4HRS  PRN PO COUGH;  Start 9/22/18 at 00:

15


Acetaminophen/ Hydrocodone Bitart (Lortab 5/325) 1 tab PRN BID  PRN PO PAIN 

Last administered on 10/1/18at 10:10;  Start 9/22/18 at 00:15


Acetaminophen/ Hydrocodone Bitart (Lortab 5/325) 1 tab QID PO  Last 

administered on 10/3/18at 20:25;  Start 9/22/18 at 09:00


Insulin Aspart (Novolog Mix 70-30) 5 units TIDWMEALS SQ  Last administered on 9/ 24/18at 17:00;  Start 9/22/18 at 08:00;  Stop 9/25/18 at 11:59;  Status DC


Cetirizine HCl (ZyrTEC) 10 mg DAILY PO  Last administered on 10/3/18at 08:07;  

Start 9/22/18 at 09:00


Al Hydroxide/Mg Hydroxide (Mylanta Plus Xs) 30 ml PRN Q6HRS  PRN PO DYSPEPSIA;  

Start 9/22/18 at 00:15


Meclizine HCl (Antivert) 25 mg PRN TID  PRN PO DIZZINESS;  Start 9/22/18 at 00:

15


Metoprolol Succinate (Toprol Xl) 100 mg DAILY PO  Last administered on 10/3/

18at 08:14;  Start 9/22/18 at 09:00


Ondansetron HCl (Zofran Odt) 8 mg PRN TID  PRN PO NAUSEA/VOMITING;  Start 9/22/ 18 at 00:15


Polyethylene Glycol (miraLAX) 17 gm DAILY PO  Last administered on 10/3/18at 08:

07;  Start 9/22/18 at 09:00


Sennosides (Senna) 8.6 mg DAILY PO  Last administered on 10/3/18at 08:07;  

Start 9/22/18 at 09:00


Warfarin Sodium (Coumadin Per Pharmacy) 1 each PRN DAILY  PRN MC SEE COMMENTS 

Last administered on 10/3/18at 15:29;  Start 9/22/18 at 00:15


Warfarin Sodium (Coumadin) 1.5 mg DAILY PO ;  Start 9/22/18 at 09:00;  Status 

UNV


Simethicone (Gas-X) 80 mg PRN QID  PRN PO GAS / BLOATING;  Start 9/22/18 at 00:

15;  Status UNV


Non-Formulary Medication (Budesonide/ Formoterol Fumarate (Symbicort 160-4.5 

Mcg Inhaler)) 2 puff BID IH ;  Start 9/22/18 at 09:00;  Stop 9/22/18 at 09:00;  

Status DC


Losartan Potassium (Cozaar) 25 mg DAILY PO  Last administered on 10/3/18at 08:07

;  Start 9/22/18 at 09:00


Pantoprazole Sodium (Protonix) 40 mg DAILYAC PO  Last administered on 10/3/18at 

08:07;  Start 9/22/18 at 07:30


Non-Formulary Medication (Tiotropium Bromide (Spiriva Respimat)) 2.5 gm DAILY 

IH ;  Start 9/22/18 at 09:00;  Stop 9/22/18 at 09:00;  Status DC


Lorazepam (Ativan) 1 mg TID PO  Last administered on 9/26/18at 12:42;  Start 9/ 22/18 at 09:00;  Stop 9/26/18 at 18:03;  Status DC


Sertraline HCl (Zoloft) 50 mg DAILY PO  Last administered on 9/24/18at 08:04;  

Start 9/22/18 at 09:00;  Stop 9/24/18 at 09:00;  Status DC


Info (Anti-Coagulation Monitoring By Pharmacy) 1 each PRN DAILY  PRN MC SEE 

COMMENTS;  Start 9/22/18 at 09:00;  Status Cancel


Albuterol/ Ipratropium (Duoneb) 3 ml RTQID NEB  Last administered on 10/3/18at 

20:36;  Start 9/22/18 at 08:00


Budesonide (Pulmicort) 0.5 mg RTBID NEB  Last administered on 10/3/18at 20:36;  

Start 9/22/18 at 08:00


Warfarin Sodium (Coumadin) 1.5 mg 1X WARF  ONCE PO  Last administered on 9/22/ 18at 17:08;  Start 9/22/18 at 16:00;  Stop 9/22/18 at 16:01;  Status DC


Warfarin Sodium (Coumadin) 0.5 mg 1X WARF  ONCE PO ;  Start 9/22/18 at 16:00;  

Stop 9/22/18 at 16:00;  Status DC


Lactobacillus Rhamnosus (Culturelle) 1 cap BID PO  Last administered on 10/3/

18at 20:25;  Start 9/22/18 at 21:00


Carbidopa/ Levodopa/ Entacapone (Stalevo 100) 1 tab PRN BID  PRN PO TREMORS;  

Start 9/22/18 at 17:00;  Stop 9/22/18 at 17:03;  Status DC


Carbidopa/Levodopa (Sinemet 25/100) 1 tab PRN BID  PRN PO TREMORS Last 

administered on 10/2/18at 19:37;  Start 9/22/18 at 17:15


Melatonin 3 mg QHS PO  Last administered on 10/3/18at 20:26;  Start 9/22/18 at 

21:00


Warfarin Sodium (Coumadin) 1.5 mg DAILY16 PO  Last administered on 9/25/18at 17:

32;  Start 9/23/18 at 16:00;  Stop 9/26/18 at 10:49;  Status DC


Sertraline HCl (Zoloft) 75 mg DAILY PO  Last administered on 10/2/18at 08:14;  

Start 9/24/18 at 09:00;  Stop 10/2/18 at 19:22;  Status DC


Insulin Human Isoph/Insulin Regular (HumuLIN 70/30) 5 units TIDWMEALS SQ ;  

Start 9/25/18 at 12:00;  Stop 9/25/18 at 13:53;  Status DC


Insulin Human Lispro (HumaLOG) 5 units TIDWMEALS SQ  Last administered on 10/3/

18at 17:24;  Start 9/25/18 at 17:00


Warfarin Sodium (Coumadin) 2 mg 1X WARF  ONCE PO  Last administered on 9/26/ 18at 17:29;  Start 9/26/18 at 16:00;  Stop 9/26/18 at 16:01;  Status DC


Lorazepam (Ativan) 1 mg HS PO  Last administered on 10/2/18at 19:37;  Start 9/26 /18 at 21:00;  Stop 10/3/18 at 01:00;  Status DC


Lorazepam (Ativan) 0.5 mg DAILY PO  Last administered on 9/29/18at 07:45;  

Start 9/27/18 at 09:00;  Stop 9/30/18 at 08:50;  Status DC


Lorazepam (Ativan) 0.5 mg BID92 PO  Last administered on 10/2/18at 13:53;  

Start 9/30/18 at 09:00;  Stop 10/3/18 at 01:00;  Status DC


Trazodone HCl (Desyrel) 50 mg QHS PO  Last administered on 10/3/18at 20:25;  

Start 9/27/18 at 21:00


Trazodone HCl (Desyrel) 50 mg PRN QHS  PRN PO INSOMNIA;  Start 9/27/18 at 11:45


Warfarin Sodium (Coumadin) 2 mg DAILY16 PO  Last administered on 10/2/18at 17:27

;  Start 9/27/18 at 16:00;  Stop 10/3/18 at 15:26;  Status DC


Lorazepam (Ativan) 0.5 mg TID PO  Last administered on 10/3/18at 20:25;  Start 

10/3/18 at 09:00


Sertraline HCl (Zoloft) 100 mg DAILY PO  Last administered on 10/3/18at 08:13;  

Start 10/3/18 at 09:00


Warfarin Sodium (Coumadin) 1.5 mg DAILY16 PO  Last administered on 10/3/18at 17:

22;  Start 10/3/18 at 16:00





Active Scripts


Active


Reported


Omeprazole 20 Mg Capsule.dr 20 Mg PO DAILY


Claritin (Loratadine) 10 Mg Tablet 10 Mg PO DAILY


Ativan (Lorazepam) 1 Mg Tablet 1 Mg PO TID


Symbicort 160-4.5 Mcg Inhaler (Budesonide/Formoterol Fumarate) 10.2 Gm 

Hfa.aer.ad 2 Puff IH BID


Simethicone 80 Mg Tab.chew 80 Mg PO PRN QID PRN


Miralax (Polyethylene Glycol 3350) 17 Gm Powd.pack 17 Gm PO PRN DAILY PRN


Metoprolol Succinate ( Xl ) (Metoprolol Succinate) 100 Mg Tab.er.24h 100 Mg PO 

DAILY


Meclizine Hcl 25 Mg Tablet 25 Mg PO PRN TID PRN


Duoneb 0.5-3(2.5) Mg/3 Ml (Albuterol/Ipratropium) 3 Ml Ampul.neb 3 Ml NEB QID 

PRN


Artificial Tears Eye Drops (Dextran 70/Hypromellose) 15 Ml Drops 1 Drop EACHEYE 

QID PRN


Spiriva Respimat (Tiotropium Bromide) 4 Gm Mist.inhal 2.5 Gm IH DAILY


Vitamin C (Ascorbate Calcium) 500 Mg Tablet 500 Mg PO BID


Amantadine (Amantadine Hcl) 100 Mg Tablet 200 Mg PO BID


Tums (Calcium Carbonate) 200 Mg Tab.chew 400 Mg PO PRN TID PRN


Hiprex (Methenamine Hippurate) 1 Gm Tablet 1 Gm PO BID


Coumadin (Warfarin Sodium) 1 Mg Tablet 0.5 Mg PO DAILY


Coumadin (Warfarin Sodium) 1 Mg Tablet 1 Mg PO DAILY


Norco 5-325 Tablet (Hydrocodone Bit/Acetaminophen) 1 Each Tablet 1 Tab PO QID


Norco 5-325 Tablet (Hydrocodone Bit/Acetaminophen) 1 Each Tablet 1 Tab PO PRN 

BID PRN


Senna (Sennosides) 8.6 Mg Tablet 8.6 Mg PO DAILY


Dayana-Lanta Liquid (Mag Hydrox/Al Hydrox/Simeth) 355 Ml Oral.susp 30 Ml PO PRN 

Q6HRS PRN


Zofran (Ondansetron Hcl) 8 Mg Tablet 8 Mg PO PRN TID PRN


Zoloft (Sertraline Hcl) 50 Mg Tablet 50 Mg PO DAILY


Novolog (Insulin Aspart) 100 Unit/1 Ml Cartridge 5 Unit SQ TIDWMEALS


Robitussin Cough-Chest Dm Liq (Guaifenesin/Dextromethorphan) 237 Ml Liquid 5 Ml 

PO PRN Q4HRS PRN


Carbidopa-Levo  Mg Odt (Carbidopa/Levodopa) 1 Each Tab.rapdis 1 Tab PO QID


Simethicone 80 Mg Tab.chew 80 Mg PO PRN QID PRN


Lantus Solostar (Insulin Glargine,Hum.rec.anlog) 100 Unit/1 Ml Insuln.pen 26 

Unit SQ HS


Amitiza (Lubiprostone) 24 Mcg Capsule 24 Mcg PO DAILY


Klor-Con M20 (Potassium Chloride) 20 Meq Tab.er.prt 20 Meq PO DAILY


Benicar (Olmesartan Medoxomil) 5 Mg Tablet 5 Mg PO DAILY


Allopurinol 300 Mg Tablet 300 Mg PO DAILY


I have reviewed the current psychotropics carefully including drug 

interactions.  Risk benefit ratio favors no change other than as noted in my 

dictated progress note.





Diagnosis:


Problems:  


(1) Dementia


(2) UTI (urinary tract infection)


(3) MDD (major depressive disorder)


(4) Anxiety disorder


(5) Mild cognitive impairment











JULIANA CARDONA MD Oct 3, 2018 21:02

## 2018-10-03 NOTE — PN
DATE:  10/02/2018



This is a late entry 10/02/2018 covers elements not covered in my initial note.



SUBJECTIVE:  I met with the patient in the evening.  The patient slept 7-3/4

hours previous night.  She remains somewhat withdrawn, has had no behavior

problems, but when I met with her in the evening, her Parkinson's symptoms had

exacerbated and she appeared quite anxious, apprehensive, dysphoric.  She was

tearful in the morning and I will defer management of her Parkinson's to Dr. Montoya.  She did get a p.r.n. of her Sinemet.



REVIEW OF SYSTEMS:  Ambulation impaired, in wheelchair.  No CV, , pulmonary,

eye system symptoms on review.



MENTAL STATUS EXAM:  Reasonably oriented.  Speech is moderate latency, low in

rate and rhythm, low in volume.  Abstraction fair, computation unable to do

serial sevens beyond one step, memory remembered 2-3 objects at 3 minutes.  No

active suicidal or homicidal ideation.  Attention span short.  Language function

intact.  Intellect average.



IMPRESSION:  Major depressive disorder, recurrent; anxiety disorder,

unspecified.  Rest as above.



PLAN:  We will go ahead and increase the Zoloft from 75 mg a day to 100 mg a

day.  Rest unchanged from initial note.  She slept 7-3/4 hours previous night.





______________________________

MAN ALEXANDR CARDONA MD



DR:  RANCHO/angel  JOB#:  [id number] / [id number]

DD:  10/03/2018 09:23  DT:  10/03/2018 22:51

## 2018-10-04 VITALS — DIASTOLIC BLOOD PRESSURE: 77 MMHG | SYSTOLIC BLOOD PRESSURE: 119 MMHG

## 2018-10-04 RX ADMIN — POLYETHYLENE GLYCOL 3350 SCH GM: 17 POWDER, FOR SOLUTION ORAL at 08:08

## 2018-10-04 RX ADMIN — CARBIDOPA AND LEVODOPA SCH TAB: 25; 250 TABLET ORAL at 00:15

## 2018-10-04 RX ADMIN — AMANTADINE HYDROCHLORIDE SCH MG: 100 CAPSULE ORAL at 07:58

## 2018-10-04 RX ADMIN — CARBIDOPA AND LEVODOPA SCH TAB: 25; 250 TABLET ORAL at 05:54

## 2018-10-04 RX ADMIN — INSULIN LISPRO SCH UNITS: 100 INJECTION, SOLUTION INTRAVENOUS; SUBCUTANEOUS at 07:59

## 2018-10-04 RX ADMIN — OXYCODONE HYDROCHLORIDE AND ACETAMINOPHEN SCH MG: 500 TABLET ORAL at 08:01

## 2018-10-04 RX ADMIN — IPRATROPIUM BROMIDE AND ALBUTEROL SULFATE SCH ML: .5; 3 SOLUTION RESPIRATORY (INHALATION) at 05:16

## 2018-10-04 RX ADMIN — ALLOPURINOL SCH MG: 300 TABLET ORAL at 08:01

## 2018-10-04 RX ADMIN — BUDESONIDE SCH MG: 0.5 SUSPENSION RESPIRATORY (INHALATION) at 08:00

## 2018-10-04 RX ADMIN — IPRATROPIUM BROMIDE AND ALBUTEROL SULFATE SCH ML: .5; 3 SOLUTION RESPIRATORY (INHALATION) at 11:34

## 2018-10-04 RX ADMIN — CETIRIZINE HYDROCHLORIDE SCH MG: 10 TABLET, FILM COATED ORAL at 07:58

## 2018-10-04 RX ADMIN — SERTRALINE HYDROCHLORIDE SCH MG: 100 TABLET ORAL at 08:01

## 2018-10-04 RX ADMIN — PANTOPRAZOLE SODIUM SCH MG: 40 TABLET, DELAYED RELEASE ORAL at 08:01

## 2018-10-04 RX ADMIN — LUBIPROSTONE SCH MCG: 24 CAPSULE, GELATIN COATED ORAL at 08:07

## 2018-10-04 RX ADMIN — Medication SCH CAP: at 08:02

## 2018-10-04 RX ADMIN — HYDROCODONE BITARTRATE AND ACETAMINOPHEN SCH TAB: 5; 325 TABLET ORAL at 08:07

## 2018-10-04 RX ADMIN — METOPROLOL SUCCINATE SCH MG: 50 TABLET, EXTENDED RELEASE ORAL at 07:58

## 2018-10-04 RX ADMIN — METHENAMINE HIPPURATE SCH GM: 1 TABLET ORAL at 07:57

## 2018-10-04 RX ADMIN — SENNOSIDES A AND B SCH MG: 8.6 TABLET, FILM COATED ORAL at 08:02

## 2018-10-04 RX ADMIN — LOSARTAN POTASSIUM SCH MG: 25 TABLET, FILM COATED ORAL at 07:57

## 2018-10-04 RX ADMIN — POTASSIUM CHLORIDE SCH MEQ: 1500 TABLET, EXTENDED RELEASE ORAL at 08:02

## 2018-10-04 NOTE — PN
DATE:  10/03/2018



This late entry for 10/03/2018 covers elements not covered in my initial note.



SUBJECTIVE:  I met with the patient in the evening.  The patient slept 7-1/2

hours previous evening.  Tremors are better, but towards the evening, she gets

more frustrated with her Parkinsonian symptoms.  We will defer to Dr. Montoya to

adjust Sinemet for Parkinson's.



REVIEW OF SYSTEMS:  Ambulation impaired, in wheelchair.  No CV, , pulmonary,

eye, ENT system symptoms on review.  Reliability varies.



MENTAL STATUS EXAM:  Reasonably oriented.  Speech moderate latency, low in rate

and rhythm, low in volume.  Abstraction fair, computation impaired, language

function intact, attention span short.  Mood and affect still somewhat

withdrawn, but improved, a little more animated as I met with her individually.



LABORATORY DATA:  Reviewed.



IMPRESSION:  Major depressive disorder, recurrent, in partial remission; anxiety

disorder, unspecified; Parkinson's disease.  Rest unchanged.



PLAN:  Continue psychotropics mentioned in my initial note.  Possible transition

to nursing home on 10/04/2018.





______________________________

MAN ALEXANDR CARDOAN MD



DR:  RANCHO/nagel  JOB#:  [id number] / [id number]

DD:  10/04/2018 14:58  DT:  10/04/2018 22:50

## 2018-10-05 NOTE — DS
DATE OF DISCHARGE:  10/04/2018



DISCHARGE SUMMARY/PSYCHIATRIC PROGRESS NOTE



This late entry for 10/04/2018 covers elements not covered in my initial note.



REASON FOR ADMISSION:  Please refer to the admission history for details. 

Briefly, the patient is a 73-year-old  female referred to us from

[facility] on account of worsening paranoia.  She believes

staff are trying to poison her stay.  She was depressed, wanting to die, believe

Gus will come and help her walk to get away from the devil.  She was pulling

on the staff members here.  Behaviors were dangerous, out of control,

unmanageable, having failed outpatient psychiatric interventions.  She was

referred for inpatient psychiatric stabilization.



SIGNIFICANT FINDINGS AND CLINICAL COURSE:  Following admission, the patient was

seen daily individually by myself, followed medically per Dr. Pritchard/Dr. Heath. 

The patient remained withdrawn, was quite overwhelmed with Parkinson's causing

movement disorder and worsening symptoms in the evening.  We did consult Dr. Montoya, Neurology to monitor her Parkinson's disease and treatment.  She was

depressed, paranoid, suspicious, anxious.  She did have UTI and was treated on

Keflex.  Adjustments were made in her psychotropics.  She seemed to respond to a

combination of Zoloft 100 mg a day, Ativan 1 mg t.i.d. gradually being tapered

and the tapering schedule should continue back at the facility.  She is also on

melatonin 3 mg at bedtime, trazodone 50 mg at bedtime, may repeat x 1, Sinemet

25/250 at 0000, 0600, 1200, 1800 and Sinemet 25/250 mg b.i.d. p.r.n.



FAMILY HISTORY:  Noncontributory.



CONDITION AT DISCHARGE:  Improved.



MENTAL STATUS EXAM:  Oriented to herself and situation.  Speech has some

latency, coherent, often responses monosyllabic.  Abstraction fair, computation

impaired, language function intact, attention span short.  Mood and affect still

depressed, anxious, but much improved and the paranoia appeared to have

resolved.  Mood and anxiety were better.



FINAL DIAGNOSES:  Major depressive disorder, recurrent with psychotic features,

in partial remission; anxiety disorder, unspecified; impulse control disorder,

unspecified; cognitive disorder, unspecified.  Rest unchanged from admission.



DISCHARGE MEDICATIONS:  Please refer to the MRAD.



DISCHARGE INSTRUCTIONS:  Outpatient psychiatric followup at the nursing home.



Time for discharge day management greater than 30 minutes.





______________________________

MAN ALEXANDR CARDONA MD



DR:  Berenice  JOB#:  [id number] / [id number]

DD:  10/05/2018 13:49  DT:  10/05/2018 21:04

## 2024-05-28 NOTE — PN
DATE:  09/28/2018



This late entry for 09/28/2018 covers elements not covered in my initial note.



SUBJECTIVE:  I met with the patient in the evening.  Overall, the patient has

been calm, cooperative, pleasant, slept 7-1/2 hours, complains of leg pain,

spends much time in the day room, still somewhat depressed, but improved.  Does

have breakthrough tremors in the afternoon.  We will defer to Dr. Montoya.



REVIEW OF SYSTEMS:  Ambulation impaired.  No CV, , pulmonary, eye, ENT system

symptoms on review.



MENTAL STATUS EXAM:  Oriented to herself, situation.  Speech has some latency,

coherent.  Abstraction fair, computation impaired, language function intact,

attention span short.  Mood and affect somewhat dysphoric, anxious.



LABORATORY DATA:  Reviewed.



IMPRESSION:  Unchanged from initial note.



PLAN:  No change from initial note.  Maintain Zoloft 75 mg a day, Ativan is

being tapered.  Maintain trazodone and melatonin.





______________________________

JULIANA CARDONA MD



DR:  RANCHO/angel  JOB#:  [id number] / [id number]

DD:  09/29/2018 22:11  DT:  09/30/2018 01:36 Prophylactic measure